# Patient Record
Sex: FEMALE | Race: BLACK OR AFRICAN AMERICAN | NOT HISPANIC OR LATINO | URBAN - METROPOLITAN AREA
[De-identification: names, ages, dates, MRNs, and addresses within clinical notes are randomized per-mention and may not be internally consistent; named-entity substitution may affect disease eponyms.]

---

## 2017-08-17 ENCOUNTER — INPATIENT (INPATIENT)
Facility: HOSPITAL | Age: 52
LOS: 3 days | Discharge: HOME | End: 2017-08-21
Attending: SURGERY | Admitting: SURGERY
Payer: MEDICAID

## 2017-08-17 VITALS
RESPIRATION RATE: 18 BRPM | SYSTOLIC BLOOD PRESSURE: 175 MMHG | HEART RATE: 75 BPM | TEMPERATURE: 98 F | DIASTOLIC BLOOD PRESSURE: 95 MMHG | OXYGEN SATURATION: 98 %

## 2017-08-17 LAB
ALBUMIN SERPL ELPH-MCNC: 4.2 G/DL — SIGNIFICANT CHANGE UP (ref 3.3–5)
ALP SERPL-CCNC: 85 U/L — SIGNIFICANT CHANGE UP (ref 40–120)
ALT FLD-CCNC: 36 U/L — HIGH (ref 4–33)
APPEARANCE UR: SIGNIFICANT CHANGE UP
AST SERPL-CCNC: 31 U/L — SIGNIFICANT CHANGE UP (ref 4–32)
BACTERIA # UR AUTO: SIGNIFICANT CHANGE UP
BASOPHILS # BLD AUTO: 0.05 K/UL — SIGNIFICANT CHANGE UP (ref 0–0.2)
BASOPHILS NFR BLD AUTO: 0.6 % — SIGNIFICANT CHANGE UP (ref 0–2)
BILIRUB SERPL-MCNC: 1.4 MG/DL — HIGH (ref 0.2–1.2)
BILIRUB UR-MCNC: NEGATIVE — SIGNIFICANT CHANGE UP
BLOOD UR QL VISUAL: NEGATIVE — SIGNIFICANT CHANGE UP
BUN SERPL-MCNC: 7 MG/DL — SIGNIFICANT CHANGE UP (ref 7–23)
CALCIUM SERPL-MCNC: 9.6 MG/DL — SIGNIFICANT CHANGE UP (ref 8.4–10.5)
CHLORIDE SERPL-SCNC: 102 MMOL/L — SIGNIFICANT CHANGE UP (ref 98–107)
CO2 SERPL-SCNC: 25 MMOL/L — SIGNIFICANT CHANGE UP (ref 22–31)
COLOR SPEC: SIGNIFICANT CHANGE UP
CREAT SERPL-MCNC: 0.71 MG/DL — SIGNIFICANT CHANGE UP (ref 0.5–1.3)
EOSINOPHIL # BLD AUTO: 0.04 K/UL — SIGNIFICANT CHANGE UP (ref 0–0.5)
EOSINOPHIL NFR BLD AUTO: 0.5 % — SIGNIFICANT CHANGE UP (ref 0–6)
GLUCOSE SERPL-MCNC: 125 MG/DL — HIGH (ref 70–99)
GLUCOSE UR-MCNC: NEGATIVE — SIGNIFICANT CHANGE UP
HCT VFR BLD CALC: 40.4 % — SIGNIFICANT CHANGE UP (ref 34.5–45)
HGB BLD-MCNC: 12.8 G/DL — SIGNIFICANT CHANGE UP (ref 11.5–15.5)
IMM GRANULOCYTES # BLD AUTO: 0.05 # — SIGNIFICANT CHANGE UP
IMM GRANULOCYTES NFR BLD AUTO: 0.6 % — SIGNIFICANT CHANGE UP (ref 0–1.5)
KETONES UR-MCNC: NEGATIVE — SIGNIFICANT CHANGE UP
LEUKOCYTE ESTERASE UR-ACNC: NEGATIVE — SIGNIFICANT CHANGE UP
LIDOCAIN IGE QN: 15.5 U/L — SIGNIFICANT CHANGE UP (ref 7–60)
LYMPHOCYTES # BLD AUTO: 1.9 K/UL — SIGNIFICANT CHANGE UP (ref 1–3.3)
LYMPHOCYTES # BLD AUTO: 23.1 % — SIGNIFICANT CHANGE UP (ref 13–44)
MCHC RBC-ENTMCNC: 24.9 PG — LOW (ref 27–34)
MCHC RBC-ENTMCNC: 31.7 % — LOW (ref 32–36)
MCV RBC AUTO: 78.6 FL — LOW (ref 80–100)
MONOCYTES # BLD AUTO: 0.66 K/UL — SIGNIFICANT CHANGE UP (ref 0–0.9)
MONOCYTES NFR BLD AUTO: 8 % — SIGNIFICANT CHANGE UP (ref 2–14)
MUCOUS THREADS # UR AUTO: SIGNIFICANT CHANGE UP
NEUTROPHILS # BLD AUTO: 5.51 K/UL — SIGNIFICANT CHANGE UP (ref 1.8–7.4)
NEUTROPHILS NFR BLD AUTO: 67.2 % — SIGNIFICANT CHANGE UP (ref 43–77)
NITRITE UR-MCNC: NEGATIVE — SIGNIFICANT CHANGE UP
NRBC # FLD: 0 — SIGNIFICANT CHANGE UP
PH UR: 7.5 — SIGNIFICANT CHANGE UP (ref 4.6–8)
PLATELET # BLD AUTO: 256 K/UL — SIGNIFICANT CHANGE UP (ref 150–400)
PMV BLD: 11.2 FL — SIGNIFICANT CHANGE UP (ref 7–13)
POTASSIUM SERPL-MCNC: 3.9 MMOL/L — SIGNIFICANT CHANGE UP (ref 3.5–5.3)
POTASSIUM SERPL-SCNC: 3.9 MMOL/L — SIGNIFICANT CHANGE UP (ref 3.5–5.3)
PROT SERPL-MCNC: 7.9 G/DL — SIGNIFICANT CHANGE UP (ref 6–8.3)
PROT UR-MCNC: NEGATIVE — SIGNIFICANT CHANGE UP
RBC # BLD: 5.14 M/UL — SIGNIFICANT CHANGE UP (ref 3.8–5.2)
RBC # FLD: 15.5 % — HIGH (ref 10.3–14.5)
SODIUM SERPL-SCNC: 141 MMOL/L — SIGNIFICANT CHANGE UP (ref 135–145)
SP GR SPEC: 1.01 — SIGNIFICANT CHANGE UP (ref 1–1.03)
SQUAMOUS # UR AUTO: SIGNIFICANT CHANGE UP
UROBILINOGEN FLD QL: NORMAL E.U. — SIGNIFICANT CHANGE UP (ref 0.1–0.2)
WBC # BLD: 8.21 K/UL — SIGNIFICANT CHANGE UP (ref 3.8–10.5)
WBC # FLD AUTO: 8.21 K/UL — SIGNIFICANT CHANGE UP (ref 3.8–10.5)
WBC UR QL: SIGNIFICANT CHANGE UP (ref 0–?)

## 2017-08-17 PROCEDURE — 76705 ECHO EXAM OF ABDOMEN: CPT | Mod: 26

## 2017-08-17 RX ORDER — MORPHINE SULFATE 50 MG/1
4 CAPSULE, EXTENDED RELEASE ORAL ONCE
Qty: 0 | Refills: 0 | Status: DISCONTINUED | OUTPATIENT
Start: 2017-08-17 | End: 2017-08-17

## 2017-08-17 RX ORDER — SODIUM CHLORIDE 9 MG/ML
1000 INJECTION INTRAMUSCULAR; INTRAVENOUS; SUBCUTANEOUS ONCE
Qty: 0 | Refills: 0 | Status: COMPLETED | OUTPATIENT
Start: 2017-08-17 | End: 2017-08-17

## 2017-08-17 RX ORDER — ONDANSETRON 8 MG/1
4 TABLET, FILM COATED ORAL ONCE
Qty: 0 | Refills: 0 | Status: COMPLETED | OUTPATIENT
Start: 2017-08-17 | End: 2017-08-17

## 2017-08-17 RX ADMIN — SODIUM CHLORIDE 1000 MILLILITER(S): 9 INJECTION INTRAMUSCULAR; INTRAVENOUS; SUBCUTANEOUS at 21:18

## 2017-08-17 RX ADMIN — MORPHINE SULFATE 4 MILLIGRAM(S): 50 CAPSULE, EXTENDED RELEASE ORAL at 21:16

## 2017-08-17 RX ADMIN — ONDANSETRON 4 MILLIGRAM(S): 8 TABLET, FILM COATED ORAL at 21:16

## 2017-08-17 NOTE — ED ADULT NURSE REASSESSMENT NOTE - NS ED NURSE REASSESS COMMENT FT1
2203 received pt from sono, pt alert and oriented x 3, no acute distress noted. 20 gauge heplock to right antecubital intact and patent. pt ambulated to bathroom with steady gait.

## 2017-08-17 NOTE — ED ADULT NURSE NOTE - OBJECTIVE STATEMENT
Oleg RN: 50 y/o female pt, rcvd in intake room 5, presents with c/o diffuse abd pain, n/v that started today, sts it happened last week but went away without any medical intervention. Pt not actively vomiting, denies chest pain, SOB, fever, chills. MD triana done, 20G placed on R AC, labs sent, medicated as ordered, VS as noted, NAD, pt went to US, to go to RW after.

## 2017-08-17 NOTE — ED PROVIDER NOTE - MEDICAL DECISION MAKING DETAILS
50 y/o F presents to the ED with abd pain for 1 days. Presentation consistent with cholecystitis. Will plan for labs, pain control, and US. 50 y/o F presents to the ED with abd pain for 1 days. Presentation consistent with possible cholecystitis. Will plan for labs, pain control, and US.

## 2017-08-17 NOTE — ED PROVIDER NOTE - PROGRESS NOTE DETAILS
RODRIGUE Harmon - pt signed out to me by Dr henning at the shift change. RUQ pain x 2 days, worse postprandially, us  equivocal, HIDA done with the results pending. on my exam pt tender in RUQ, but non-toxic appearing, denies hx of gallbladder disease. will provide additional pain control, will place in cdu. RODRIGUE Reynolds pt will be evaluted by sx, will provide additional pain control and move to main. RODRIGUE Harmon - pt signed out to me by Dr Franks at the shift change. RUQ pain x 2 days, worse postprandially, us  equivocal, HIDA done with the results pending. on my exam pt tender in RUQ, but non-toxic appearing, denies hx of gallbladder disease. will provide additional pain control, will place in cdu. Attending Note (Skylar): radiology called and informed me of positive HIDA results.  Surgery paged and informed.

## 2017-08-17 NOTE — ED PROVIDER NOTE - OBJECTIVE STATEMENT
52 y/o F with PMH of HTN, presents to the ED with abd pain for 1 week. Pt describes the pain as localized to the epigastric area, and intermittent in timing. She notes that the pain was sever this morning, and has associated nausea, chills, and 1 episode of vomiting (non-blood). She currently denies any fever, CP, SOB, diarrhea, and dysuria. 52 y/o F with PMH of HTN, presents to the ED with abd pain for 1 week. Pt describes the pain as localized to the epigastric area, and intermittent in timing. She notes that the pain was severe this morning, and has associated nausea, chills, and 1 episode of vomiting (non-blood). She currently denies any fever, CP, SOB, diarrhea, and dysuria.

## 2017-08-17 NOTE — ED ADULT NURSE NOTE - ED STAT RN HANDOFF DETAILS
endorsed to nathan walls. pt a*o x3, no c/o pain. vss. nad noted. medicated as ordered. safety maintained

## 2017-08-17 NOTE — ED PROVIDER NOTE - NS_EDPROVIDERDISPOUSERTYPE_ED_A_ED
I have personally evaluated and examined the patient. The Attending was available to me as a supervising provider if needed. Scribe Attestation (For Scribes USE Only)...

## 2017-08-18 DIAGNOSIS — R10.9 UNSPECIFIED ABDOMINAL PAIN: ICD-10-CM

## 2017-08-18 LAB
ALBUMIN SERPL ELPH-MCNC: 3.8 G/DL — SIGNIFICANT CHANGE UP (ref 3.3–5)
ALP SERPL-CCNC: 77 U/L — SIGNIFICANT CHANGE UP (ref 40–120)
ALT FLD-CCNC: 28 U/L — SIGNIFICANT CHANGE UP (ref 4–33)
APTT BLD: 41 SEC — HIGH (ref 27.5–37.4)
AST SERPL-CCNC: 21 U/L — SIGNIFICANT CHANGE UP (ref 4–32)
BASOPHILS # BLD AUTO: 0.04 K/UL — SIGNIFICANT CHANGE UP (ref 0–0.2)
BASOPHILS NFR BLD AUTO: 0.5 % — SIGNIFICANT CHANGE UP (ref 0–2)
BILIRUB DIRECT SERPL-MCNC: 0.3 MG/DL — HIGH (ref 0.1–0.2)
BILIRUB SERPL-MCNC: 1.2 MG/DL — SIGNIFICANT CHANGE UP (ref 0.2–1.2)
BLD GP AB SCN SERPL QL: NEGATIVE — SIGNIFICANT CHANGE UP
BUN SERPL-MCNC: 7 MG/DL — SIGNIFICANT CHANGE UP (ref 7–23)
CALCIUM SERPL-MCNC: 9 MG/DL — SIGNIFICANT CHANGE UP (ref 8.4–10.5)
CHLORIDE SERPL-SCNC: 101 MMOL/L — SIGNIFICANT CHANGE UP (ref 98–107)
CO2 SERPL-SCNC: 24 MMOL/L — SIGNIFICANT CHANGE UP (ref 22–31)
CREAT SERPL-MCNC: 0.77 MG/DL — SIGNIFICANT CHANGE UP (ref 0.5–1.3)
EOSINOPHIL # BLD AUTO: 0.05 K/UL — SIGNIFICANT CHANGE UP (ref 0–0.5)
EOSINOPHIL NFR BLD AUTO: 0.6 % — SIGNIFICANT CHANGE UP (ref 0–6)
GLUCOSE SERPL-MCNC: 106 MG/DL — HIGH (ref 70–99)
HCG SERPL-ACNC: < 5 MIU/ML — SIGNIFICANT CHANGE UP
HCT VFR BLD CALC: 37.9 % — SIGNIFICANT CHANGE UP (ref 34.5–45)
HGB BLD-MCNC: 11.9 G/DL — SIGNIFICANT CHANGE UP (ref 11.5–15.5)
IMM GRANULOCYTES # BLD AUTO: 0.04 # — SIGNIFICANT CHANGE UP
IMM GRANULOCYTES NFR BLD AUTO: 0.5 % — SIGNIFICANT CHANGE UP (ref 0–1.5)
INR BLD: 1.02 — SIGNIFICANT CHANGE UP (ref 0.88–1.17)
LIDOCAIN IGE QN: 10.8 U/L — SIGNIFICANT CHANGE UP (ref 7–60)
LYMPHOCYTES # BLD AUTO: 2.48 K/UL — SIGNIFICANT CHANGE UP (ref 1–3.3)
LYMPHOCYTES # BLD AUTO: 29.1 % — SIGNIFICANT CHANGE UP (ref 13–44)
MCHC RBC-ENTMCNC: 24.4 PG — LOW (ref 27–34)
MCHC RBC-ENTMCNC: 31.4 % — LOW (ref 32–36)
MCV RBC AUTO: 77.8 FL — LOW (ref 80–100)
MONOCYTES # BLD AUTO: 0.68 K/UL — SIGNIFICANT CHANGE UP (ref 0–0.9)
MONOCYTES NFR BLD AUTO: 8 % — SIGNIFICANT CHANGE UP (ref 2–14)
NEUTROPHILS # BLD AUTO: 5.24 K/UL — SIGNIFICANT CHANGE UP (ref 1.8–7.4)
NEUTROPHILS NFR BLD AUTO: 61.3 % — SIGNIFICANT CHANGE UP (ref 43–77)
NRBC # FLD: 0 — SIGNIFICANT CHANGE UP
PLATELET # BLD AUTO: 243 K/UL — SIGNIFICANT CHANGE UP (ref 150–400)
PMV BLD: 11.6 FL — SIGNIFICANT CHANGE UP (ref 7–13)
POTASSIUM SERPL-MCNC: 3.6 MMOL/L — SIGNIFICANT CHANGE UP (ref 3.5–5.3)
POTASSIUM SERPL-SCNC: 3.6 MMOL/L — SIGNIFICANT CHANGE UP (ref 3.5–5.3)
PROT SERPL-MCNC: 7.1 G/DL — SIGNIFICANT CHANGE UP (ref 6–8.3)
PROTHROM AB SERPL-ACNC: 11.4 SEC — SIGNIFICANT CHANGE UP (ref 9.8–13.1)
RBC # BLD: 4.87 M/UL — SIGNIFICANT CHANGE UP (ref 3.8–5.2)
RBC # FLD: 15.7 % — HIGH (ref 10.3–14.5)
RH IG SCN BLD-IMP: POSITIVE — SIGNIFICANT CHANGE UP
SODIUM SERPL-SCNC: 140 MMOL/L — SIGNIFICANT CHANGE UP (ref 135–145)
WBC # BLD: 8.53 K/UL — SIGNIFICANT CHANGE UP (ref 3.8–10.5)
WBC # FLD AUTO: 8.53 K/UL — SIGNIFICANT CHANGE UP (ref 3.8–10.5)

## 2017-08-18 PROCEDURE — 78226 HEPATOBILIARY SYSTEM IMAGING: CPT | Mod: 26,GC

## 2017-08-18 RX ORDER — AMLODIPINE BESYLATE 2.5 MG/1
1 TABLET ORAL
Qty: 0 | Refills: 0 | COMMUNITY

## 2017-08-18 RX ORDER — POTASSIUM CHLORIDE 20 MEQ
10 PACKET (EA) ORAL
Qty: 0 | Refills: 0 | Status: COMPLETED | OUTPATIENT
Start: 2017-08-18 | End: 2017-08-18

## 2017-08-18 RX ORDER — ONDANSETRON 8 MG/1
4 TABLET, FILM COATED ORAL EVERY 6 HOURS
Qty: 0 | Refills: 0 | Status: DISCONTINUED | OUTPATIENT
Start: 2017-08-18 | End: 2017-08-21

## 2017-08-18 RX ORDER — CEFOTETAN DISODIUM 1 G
2 VIAL (EA) INJECTION ONCE
Qty: 0 | Refills: 0 | Status: COMPLETED | OUTPATIENT
Start: 2017-08-18 | End: 2017-08-18

## 2017-08-18 RX ORDER — LISINOPRIL 2.5 MG/1
20 TABLET ORAL DAILY
Qty: 0 | Refills: 0 | Status: DISCONTINUED | OUTPATIENT
Start: 2017-08-18 | End: 2017-08-18

## 2017-08-18 RX ORDER — SODIUM CHLORIDE 9 MG/ML
1000 INJECTION, SOLUTION INTRAVENOUS
Qty: 0 | Refills: 0 | Status: DISCONTINUED | OUTPATIENT
Start: 2017-08-18 | End: 2017-08-21

## 2017-08-18 RX ORDER — HYDROMORPHONE HYDROCHLORIDE 2 MG/ML
1 INJECTION INTRAMUSCULAR; INTRAVENOUS; SUBCUTANEOUS EVERY 4 HOURS
Qty: 0 | Refills: 0 | Status: DISCONTINUED | OUTPATIENT
Start: 2017-08-18 | End: 2017-08-20

## 2017-08-18 RX ORDER — HYDROMORPHONE HYDROCHLORIDE 2 MG/ML
0.5 INJECTION INTRAMUSCULAR; INTRAVENOUS; SUBCUTANEOUS EVERY 4 HOURS
Qty: 0 | Refills: 0 | Status: DISCONTINUED | OUTPATIENT
Start: 2017-08-18 | End: 2017-08-20

## 2017-08-18 RX ORDER — FERROUS SULFATE 325(65) MG
1 TABLET ORAL
Qty: 0 | Refills: 0 | COMMUNITY

## 2017-08-18 RX ORDER — AMLODIPINE BESYLATE 2.5 MG/1
10 TABLET ORAL DAILY
Qty: 0 | Refills: 0 | Status: DISCONTINUED | OUTPATIENT
Start: 2017-08-18 | End: 2017-08-18

## 2017-08-18 RX ORDER — ENOXAPARIN SODIUM 100 MG/ML
40 INJECTION SUBCUTANEOUS EVERY 24 HOURS
Qty: 0 | Refills: 0 | Status: DISCONTINUED | OUTPATIENT
Start: 2017-08-18 | End: 2017-08-21

## 2017-08-18 RX ORDER — ACETAMINOPHEN 500 MG
650 TABLET ORAL EVERY 6 HOURS
Qty: 0 | Refills: 0 | Status: DISCONTINUED | OUTPATIENT
Start: 2017-08-18 | End: 2017-08-21

## 2017-08-18 RX ORDER — GABAPENTIN 400 MG/1
1 CAPSULE ORAL
Qty: 0 | Refills: 0 | COMMUNITY

## 2017-08-18 RX ORDER — AMLODIPINE BESYLATE 2.5 MG/1
10 TABLET ORAL DAILY
Qty: 0 | Refills: 0 | Status: DISCONTINUED | OUTPATIENT
Start: 2017-08-18 | End: 2017-08-21

## 2017-08-18 RX ORDER — LISINOPRIL 2.5 MG/1
1 TABLET ORAL
Qty: 0 | Refills: 0 | COMMUNITY

## 2017-08-18 RX ORDER — LISINOPRIL 2.5 MG/1
20 TABLET ORAL DAILY
Qty: 0 | Refills: 0 | Status: DISCONTINUED | OUTPATIENT
Start: 2017-08-18 | End: 2017-08-21

## 2017-08-18 RX ORDER — GABAPENTIN 400 MG/1
300 CAPSULE ORAL THREE TIMES A DAY
Qty: 0 | Refills: 0 | Status: DISCONTINUED | OUTPATIENT
Start: 2017-08-18 | End: 2017-08-21

## 2017-08-18 RX ORDER — MORPHINE SULFATE 50 MG/1
4 CAPSULE, EXTENDED RELEASE ORAL ONCE
Qty: 0 | Refills: 0 | Status: DISCONTINUED | OUTPATIENT
Start: 2017-08-18 | End: 2017-08-18

## 2017-08-18 RX ADMIN — LISINOPRIL 20 MILLIGRAM(S): 2.5 TABLET ORAL at 05:08

## 2017-08-18 RX ADMIN — GABAPENTIN 300 MILLIGRAM(S): 400 CAPSULE ORAL at 05:08

## 2017-08-18 RX ADMIN — AMLODIPINE BESYLATE 10 MILLIGRAM(S): 2.5 TABLET ORAL at 05:08

## 2017-08-18 RX ADMIN — SODIUM CHLORIDE 100 MILLILITER(S): 9 INJECTION, SOLUTION INTRAVENOUS at 21:51

## 2017-08-18 RX ADMIN — GABAPENTIN 300 MILLIGRAM(S): 400 CAPSULE ORAL at 21:51

## 2017-08-18 RX ADMIN — MORPHINE SULFATE 4 MILLIGRAM(S): 50 CAPSULE, EXTENDED RELEASE ORAL at 03:40

## 2017-08-18 RX ADMIN — MORPHINE SULFATE 4 MILLIGRAM(S): 50 CAPSULE, EXTENDED RELEASE ORAL at 02:39

## 2017-08-18 RX ADMIN — Medication 100 MILLIEQUIVALENT(S): at 10:14

## 2017-08-18 RX ADMIN — Medication 100 MILLIEQUIVALENT(S): at 08:53

## 2017-08-18 RX ADMIN — ENOXAPARIN SODIUM 40 MILLIGRAM(S): 100 INJECTION SUBCUTANEOUS at 04:08

## 2017-08-18 RX ADMIN — Medication 100 MILLIEQUIVALENT(S): at 11:28

## 2017-08-18 RX ADMIN — Medication 100 GRAM(S): at 04:00

## 2017-08-18 NOTE — H&P ADULT - ATTENDING COMMENTS
I have reviewed the history, pertinent labs and imaging, and discussed the care with the consult resident.    The active issues are:  1. acute calculous cholecystitis with mildly elevated bilirubin concerning for choledocholithiasis    Given the absence of elevated alkaline phosphatase and ductal dilation, it is a low suspicion for CBD obstruction due to stones.  Will repeat LFTs and proceed with lap mary if improved, possibly with IOC.

## 2017-08-18 NOTE — H&P ADULT - NSHPSOCIALHISTORY_GEN_ALL_CORE
Patient lives at home with her fiance and kids  She works at the [place] [facility] facility  No ETOH, drugs, or cigarettes

## 2017-08-18 NOTE — H&P ADULT - HISTORY OF PRESENT ILLNESS
51F with HTN presents with one day of abdominal pain. Patient states that she has had this pain in the past, not associated with eating or any activities, but it usually resolves on its own. This time, the pain started yesterday, was more severe in quality, and associated with one episode of emesis. The patient presented last night. No fevers or chills at home, no change in bowel habits.  In the ED, patient was afebrile and hypertensive to 176/93. She was given morphine for pain control, but when examined had some tenderness in the right upper quadrant, with the rest of her abdominal exam benign. Her labs showed a normal WBC of 8, but an elevated T. bili to 1.4. RUQ U/S showed no ductal dilatation, no wall thickening, cholecystitis. HIDA scan positive for acute cholecystitis.

## 2017-08-18 NOTE — H&P ADULT - NSHPLABSRESULTS_GEN_ALL_CORE
WBC 8  T bili 1.4  U/S   Liver:  Hepatic  steatosis.  Bile  ducts:  Normal  caliber.  Common  hepatic  duct  measures  7  mm.    Gallbladder:  Multiple  gallstones  with  largest  measuring  1.2  cm  stone  at  the  gallbladder  neck.  Gallbladder  wall  measures  3  mm  in  thickness,  upper  limits  of  normal..  Positive  sonographic  Madsen's  sign.  Pancreas:  Visualized  pancreatic  neck  and  body  shows  no  focal  ductal  dilatation.  Right  kidney:  1.3.  cm.  No  hydronephrosis.  IVC:  Visualized  portions  are  within  normal  limits.    IMPRESSION:    Cholelithiasis  with  a  positive  Madsen's  sonographic  sign  but  without  wall  thickening  is  equivocal  for  acute  cholecystitis.  Consider  HIDA  scan  for  further  evaluation.    Hepatic  steatosis.    HIDA + for acute cholecystitis

## 2017-08-18 NOTE — H&P ADULT - NSHPPHYSICALEXAM_GEN_ALL_CORE
Vital Signs Last 24 Hrs  T(C): 37.3 (17 Aug 2017 23:02), Max: 37.3 (17 Aug 2017 23:02)  T(F): 99.1 (17 Aug 2017 23:02), Max: 99.1 (17 Aug 2017 23:02)  HR: 69 (18 Aug 2017 02:35) (69 - 77)  BP: 176/93 (18 Aug 2017 02:35) (141/76 - 176/93)  BP(mean): --  RR: 17 (18 Aug 2017 02:35) (17 - 18)  SpO2: 100% (18 Aug 2017 02:35) (98% - 100%)    PHYSICAL EXAM:      Constitutional: NAD    Eyes: anicteric    ENMT: no rhinorrhea    Neck: supple    Respiratory: Clear bilaterally, respirations not labored, no retractions    Cardiovascular: RRR, NL S1S2    Gastrointestinal: Soft, ND, tender in RUQ    Extremities: No deformities    Vascular: Ext. warm, normal cap refill    Neurological: sensation and motor intact to all extremities    Skin: warm, dry, no rash    Lymph Nodes: no palpable adenopathy Vital Signs Last 24 Hrs  T(C): 37.3 (17 Aug 2017 23:02), Max: 37.3 (17 Aug 2017 23:02)  T(F): 99.1 (17 Aug 2017 23:02), Max: 99.1 (17 Aug 2017 23:02)  HR: 69 (18 Aug 2017 02:35) (69 - 77)  BP: 176/93 (18 Aug 2017 02:35) (141/76 - 176/93)  BP(mean): --  RR: 17 (18 Aug 2017 02:35) (17 - 18)  SpO2: 100% (18 Aug 2017 02:35) (98% - 100%)    PHYSICAL EXAM:      Constitutional: NAD    Eyes: anicteric    ENMT: no rhinorrhea    Neck: supple    Respiratory: Clear bilaterally, respirations not labored, no retractions    Cardiovascular: RRR, NL S1S2    Gastrointestinal: Soft, obese, ND, tender in RUQ    Extremities: No deformities    Vascular: Ext. warm, normal cap refill    Neurological: sensation and motor intact to all extremities    Skin: warm, dry, no rash    Lymph Nodes: no palpable adenopathy

## 2017-08-18 NOTE — PROGRESS NOTE ADULT - ASSESSMENT
ASSESSMENT/PLAN: SIENNA RIVERO 51y Female w/ acute cholecystitis   - Diet: NPO   - Pain control   - Input and outputs   -DVT ppx  - OR plan     B Team   [id number]

## 2017-08-18 NOTE — H&P ADULT - ASSESSMENT
51F p/w acute cholecystitis  -NPO with IVF  -home BP medications  - IV abx  - repeat labs in AM to check total bilirubin  - pain control  - discussed with Dr. Rowland

## 2017-08-19 LAB
ALBUMIN SERPL ELPH-MCNC: 3.4 G/DL — SIGNIFICANT CHANGE UP (ref 3.3–5)
ALP SERPL-CCNC: 72 U/L — SIGNIFICANT CHANGE UP (ref 40–120)
ALT FLD-CCNC: 24 U/L — SIGNIFICANT CHANGE UP (ref 4–33)
AST SERPL-CCNC: 21 U/L — SIGNIFICANT CHANGE UP (ref 4–32)
BILIRUB SERPL-MCNC: 1.4 MG/DL — HIGH (ref 0.2–1.2)
BLD GP AB SCN SERPL QL: NEGATIVE — SIGNIFICANT CHANGE UP
BUN SERPL-MCNC: 8 MG/DL — SIGNIFICANT CHANGE UP (ref 7–23)
CALCIUM SERPL-MCNC: 9 MG/DL — SIGNIFICANT CHANGE UP (ref 8.4–10.5)
CHLORIDE SERPL-SCNC: 103 MMOL/L — SIGNIFICANT CHANGE UP (ref 98–107)
CO2 SERPL-SCNC: 23 MMOL/L — SIGNIFICANT CHANGE UP (ref 22–31)
CREAT SERPL-MCNC: 0.8 MG/DL — SIGNIFICANT CHANGE UP (ref 0.5–1.3)
GLUCOSE SERPL-MCNC: 86 MG/DL — SIGNIFICANT CHANGE UP (ref 70–99)
HCT VFR BLD CALC: 36.6 % — SIGNIFICANT CHANGE UP (ref 34.5–45)
HGB BLD-MCNC: 11.6 G/DL — SIGNIFICANT CHANGE UP (ref 11.5–15.5)
MAGNESIUM SERPL-MCNC: 2.2 MG/DL — SIGNIFICANT CHANGE UP (ref 1.6–2.6)
MCHC RBC-ENTMCNC: 24.6 PG — LOW (ref 27–34)
MCHC RBC-ENTMCNC: 31.7 % — LOW (ref 32–36)
MCV RBC AUTO: 77.7 FL — LOW (ref 80–100)
NRBC # FLD: 0 — SIGNIFICANT CHANGE UP
PHOSPHATE SERPL-MCNC: 2.8 MG/DL — SIGNIFICANT CHANGE UP (ref 2.5–4.5)
PLATELET # BLD AUTO: 249 K/UL — SIGNIFICANT CHANGE UP (ref 150–400)
PMV BLD: 11.7 FL — SIGNIFICANT CHANGE UP (ref 7–13)
POTASSIUM SERPL-MCNC: 4 MMOL/L — SIGNIFICANT CHANGE UP (ref 3.5–5.3)
POTASSIUM SERPL-SCNC: 4 MMOL/L — SIGNIFICANT CHANGE UP (ref 3.5–5.3)
PROT SERPL-MCNC: 6.6 G/DL — SIGNIFICANT CHANGE UP (ref 6–8.3)
RBC # BLD: 4.71 M/UL — SIGNIFICANT CHANGE UP (ref 3.8–5.2)
RBC # FLD: 15.7 % — HIGH (ref 10.3–14.5)
RH IG SCN BLD-IMP: POSITIVE — SIGNIFICANT CHANGE UP
SODIUM SERPL-SCNC: 138 MMOL/L — SIGNIFICANT CHANGE UP (ref 135–145)
WBC # BLD: 6.38 K/UL — SIGNIFICANT CHANGE UP (ref 3.8–10.5)
WBC # FLD AUTO: 6.38 K/UL — SIGNIFICANT CHANGE UP (ref 3.8–10.5)

## 2017-08-19 RX ADMIN — GABAPENTIN 300 MILLIGRAM(S): 400 CAPSULE ORAL at 13:11

## 2017-08-19 RX ADMIN — LISINOPRIL 20 MILLIGRAM(S): 2.5 TABLET ORAL at 05:11

## 2017-08-19 RX ADMIN — SODIUM CHLORIDE 100 MILLILITER(S): 9 INJECTION, SOLUTION INTRAVENOUS at 05:11

## 2017-08-19 RX ADMIN — ENOXAPARIN SODIUM 40 MILLIGRAM(S): 100 INJECTION SUBCUTANEOUS at 05:11

## 2017-08-19 RX ADMIN — AMLODIPINE BESYLATE 10 MILLIGRAM(S): 2.5 TABLET ORAL at 05:11

## 2017-08-19 RX ADMIN — SODIUM CHLORIDE 100 MILLILITER(S): 9 INJECTION, SOLUTION INTRAVENOUS at 21:54

## 2017-08-19 RX ADMIN — SODIUM CHLORIDE 100 MILLILITER(S): 9 INJECTION, SOLUTION INTRAVENOUS at 13:11

## 2017-08-19 RX ADMIN — SODIUM CHLORIDE 100 MILLILITER(S): 9 INJECTION, SOLUTION INTRAVENOUS at 11:30

## 2017-08-19 RX ADMIN — GABAPENTIN 300 MILLIGRAM(S): 400 CAPSULE ORAL at 05:11

## 2017-08-19 RX ADMIN — GABAPENTIN 300 MILLIGRAM(S): 400 CAPSULE ORAL at 21:54

## 2017-08-19 NOTE — CHART NOTE - NSCHARTNOTEFT_GEN_A_CORE
Diagnosis: cholecystitis  Procedure: lap mary possible open  T(C): 36.7 (08-19-17 @ 18:15), Max: 37.1 (08-18-17 @ 20:59)  HR: 78 (08-19-17 @ 18:15) (66 - 78)  BP: 144/92 (08-19-17 @ 18:15) (117/73 - 146/71)  RR: 18 (08-19-17 @ 18:15) (17 - 18)  SpO2: 99% (08-19-17 @ 18:15) (87% - 99%)  Wt(kg): --        Pertinent Labs:                            11.6   6.38  )-----------( 249      ( 19 Aug 2017 06:30 )             36.6       08-19    138  |  103  |  8   ----------------------------<  86  4.0   |  23  |  0.80    Ca    9.0      19 Aug 2017 06:30  Phos  2.8     08-19  Mg     2.2     08-19    TPro  6.6  /  Alb  3.4  /  TBili  1.4<H>  /  DBili  x   /  AST  21  /  ALT  24  /  AlkPhos  72  08-19      PT/INR - ( 18 Aug 2017 06:00 )   PT: 11.4 SEC;   INR: 1.02          PTT - ( 18 Aug 2017 06:00 )  PTT:41.0 SEC      Permit: Signed and in chart  Type and screen performed  Urine pregnancy negative 8/17, repeat pending    Plan:  NPO after midnight  IVF  OR tomorrow with Dr. Rowland

## 2017-08-19 NOTE — PROGRESS NOTE ADULT - ASSESSMENT
ASSESSMENT/PLAN: SIENNA RIVERO 51y Female w/ acute cholecystitis   - Diet: CLD -> regular as tolerated, NPO after midnight  - Pain control   - Input and outputs   - DVT ppx  - Plan for  OR tomorrow for lap mary    B Team   [id number]

## 2017-08-20 ENCOUNTER — TRANSCRIPTION ENCOUNTER (OUTPATIENT)
Age: 52
End: 2017-08-20

## 2017-08-20 ENCOUNTER — RESULT REVIEW (OUTPATIENT)
Age: 52
End: 2017-08-20

## 2017-08-20 LAB
ALBUMIN SERPL ELPH-MCNC: 3.6 G/DL — SIGNIFICANT CHANGE UP (ref 3.3–5)
ALP SERPL-CCNC: 72 U/L — SIGNIFICANT CHANGE UP (ref 40–120)
ALT FLD-CCNC: 22 U/L — SIGNIFICANT CHANGE UP (ref 4–33)
AST SERPL-CCNC: 17 U/L — SIGNIFICANT CHANGE UP (ref 4–32)
BILIRUB DIRECT SERPL-MCNC: 0.2 MG/DL — SIGNIFICANT CHANGE UP (ref 0.1–0.2)
BILIRUB SERPL-MCNC: 0.8 MG/DL — SIGNIFICANT CHANGE UP (ref 0.2–1.2)
BUN SERPL-MCNC: 10 MG/DL — SIGNIFICANT CHANGE UP (ref 7–23)
CALCIUM SERPL-MCNC: 9.3 MG/DL — SIGNIFICANT CHANGE UP (ref 8.4–10.5)
CHLORIDE SERPL-SCNC: 103 MMOL/L — SIGNIFICANT CHANGE UP (ref 98–107)
CO2 SERPL-SCNC: 23 MMOL/L — SIGNIFICANT CHANGE UP (ref 22–31)
CREAT SERPL-MCNC: 0.7 MG/DL — SIGNIFICANT CHANGE UP (ref 0.5–1.3)
GLUCOSE SERPL-MCNC: 122 MG/DL — HIGH (ref 70–99)
HCT VFR BLD CALC: 36.5 % — SIGNIFICANT CHANGE UP (ref 34.5–45)
HGB BLD-MCNC: 11.4 G/DL — LOW (ref 11.5–15.5)
INR BLD: 0.99 — SIGNIFICANT CHANGE UP (ref 0.88–1.17)
MAGNESIUM SERPL-MCNC: 2 MG/DL — SIGNIFICANT CHANGE UP (ref 1.6–2.6)
MCHC RBC-ENTMCNC: 24.3 PG — LOW (ref 27–34)
MCHC RBC-ENTMCNC: 31.2 % — LOW (ref 32–36)
MCV RBC AUTO: 77.7 FL — LOW (ref 80–100)
NRBC # FLD: 0 — SIGNIFICANT CHANGE UP
PHOSPHATE SERPL-MCNC: 3.2 MG/DL — SIGNIFICANT CHANGE UP (ref 2.5–4.5)
PLATELET # BLD AUTO: 272 K/UL — SIGNIFICANT CHANGE UP (ref 150–400)
PMV BLD: 11.4 FL — SIGNIFICANT CHANGE UP (ref 7–13)
POTASSIUM SERPL-MCNC: 3.9 MMOL/L — SIGNIFICANT CHANGE UP (ref 3.5–5.3)
POTASSIUM SERPL-SCNC: 3.9 MMOL/L — SIGNIFICANT CHANGE UP (ref 3.5–5.3)
PROT SERPL-MCNC: 6.7 G/DL — SIGNIFICANT CHANGE UP (ref 6–8.3)
PROTHROM AB SERPL-ACNC: 11.1 SEC — SIGNIFICANT CHANGE UP (ref 9.8–13.1)
RBC # BLD: 4.7 M/UL — SIGNIFICANT CHANGE UP (ref 3.8–5.2)
RBC # FLD: 15.8 % — HIGH (ref 10.3–14.5)
SODIUM SERPL-SCNC: 140 MMOL/L — SIGNIFICANT CHANGE UP (ref 135–145)
WBC # BLD: 6.11 K/UL — SIGNIFICANT CHANGE UP (ref 3.8–10.5)
WBC # FLD AUTO: 6.11 K/UL — SIGNIFICANT CHANGE UP (ref 3.8–10.5)

## 2017-08-20 PROCEDURE — 47562 LAPAROSCOPIC CHOLECYSTECTOMY: CPT | Mod: GC

## 2017-08-20 PROCEDURE — 88304 TISSUE EXAM BY PATHOLOGIST: CPT | Mod: 26

## 2017-08-20 RX ORDER — OXYCODONE AND ACETAMINOPHEN 5; 325 MG/1; MG/1
2 TABLET ORAL EVERY 6 HOURS
Qty: 0 | Refills: 0 | Status: DISCONTINUED | OUTPATIENT
Start: 2017-08-20 | End: 2017-08-21

## 2017-08-20 RX ORDER — HYDROMORPHONE HYDROCHLORIDE 2 MG/ML
1 INJECTION INTRAMUSCULAR; INTRAVENOUS; SUBCUTANEOUS
Qty: 0 | Refills: 0 | Status: DISCONTINUED | OUTPATIENT
Start: 2017-08-20 | End: 2017-08-20

## 2017-08-20 RX ORDER — ONDANSETRON 8 MG/1
4 TABLET, FILM COATED ORAL ONCE
Qty: 0 | Refills: 0 | Status: DISCONTINUED | OUTPATIENT
Start: 2017-08-20 | End: 2017-08-20

## 2017-08-20 RX ORDER — OXYCODONE AND ACETAMINOPHEN 5; 325 MG/1; MG/1
1 TABLET ORAL EVERY 4 HOURS
Qty: 0 | Refills: 0 | Status: DISCONTINUED | OUTPATIENT
Start: 2017-08-20 | End: 2017-08-21

## 2017-08-20 RX ADMIN — Medication 650 MILLIGRAM(S): at 22:02

## 2017-08-20 RX ADMIN — SODIUM CHLORIDE 50 MILLILITER(S): 9 INJECTION, SOLUTION INTRAVENOUS at 19:54

## 2017-08-20 RX ADMIN — ENOXAPARIN SODIUM 40 MILLIGRAM(S): 100 INJECTION SUBCUTANEOUS at 05:21

## 2017-08-20 RX ADMIN — SODIUM CHLORIDE 100 MILLILITER(S): 9 INJECTION, SOLUTION INTRAVENOUS at 05:21

## 2017-08-20 RX ADMIN — GABAPENTIN 300 MILLIGRAM(S): 400 CAPSULE ORAL at 21:11

## 2017-08-20 RX ADMIN — Medication 650 MILLIGRAM(S): at 22:50

## 2017-08-20 RX ADMIN — GABAPENTIN 300 MILLIGRAM(S): 400 CAPSULE ORAL at 05:21

## 2017-08-20 RX ADMIN — AMLODIPINE BESYLATE 10 MILLIGRAM(S): 2.5 TABLET ORAL at 05:21

## 2017-08-20 RX ADMIN — SODIUM CHLORIDE 50 MILLILITER(S): 9 INJECTION, SOLUTION INTRAVENOUS at 21:11

## 2017-08-20 RX ADMIN — LISINOPRIL 20 MILLIGRAM(S): 2.5 TABLET ORAL at 05:21

## 2017-08-20 NOTE — PROVIDER CONTACT NOTE (OTHER) - REASON
pt due to go to OR today and ordered for Lovenox Injection, ok to give?
pt scheduled to go to OR today or tomorrow however pt is due to Lovenox Injection at 4am today, ok to give?

## 2017-08-20 NOTE — PROGRESS NOTE ADULT - ATTENDING COMMENTS
I have personally interviewed and examined this patient, reviewed pertinent labs and imaging, and discussed the case with residents on B Team rounds.    The active care issues are:  1. acute cholecystitis with normalized LFTs suggesting passage of CBD stone    Patient understands the risks, benefits, alternatives and recovery from laparoscopic versus open cholecystectomy, and we will proceed today pending OR availability.
Acute cholecystitis  -Resolved pain  -Clears trial  -NPO at midnight.  Will reevaluate for surgery tomorrow  -Abx

## 2017-08-20 NOTE — PROGRESS NOTE ADULT - ASSESSMENT
ASSESSMENT/PLAN: SIENNA RIVERO 51y Female w/ acute cholecystitis   - Diet: NPO for OR today  - Pain control   - Input and outputs   - DVT ppx  - OR today for lap mary possible open    B Team   [id number]

## 2017-08-20 NOTE — PROVIDER CONTACT NOTE (OTHER) - ACTION/TREATMENT ORDERED:
ok to give Lovenox Injection as scheduled; continue to monitor
ok to give Lovenox Injection as scheduled as pt is not confirmed to go to OR today; continue to monitor

## 2017-08-21 ENCOUNTER — TRANSCRIPTION ENCOUNTER (OUTPATIENT)
Age: 52
End: 2017-08-21

## 2017-08-21 VITALS
TEMPERATURE: 98 F | HEART RATE: 76 BPM | SYSTOLIC BLOOD PRESSURE: 131 MMHG | RESPIRATION RATE: 20 BRPM | DIASTOLIC BLOOD PRESSURE: 88 MMHG | OXYGEN SATURATION: 100 %

## 2017-08-21 RX ORDER — ACETAMINOPHEN 500 MG
2 TABLET ORAL
Qty: 0 | Refills: 0 | COMMUNITY
Start: 2017-08-21

## 2017-08-21 RX ORDER — OXYCODONE HYDROCHLORIDE 5 MG/1
1 TABLET ORAL
Qty: 12 | Refills: 0 | OUTPATIENT
Start: 2017-08-21

## 2017-08-21 RX ADMIN — Medication 650 MILLIGRAM(S): at 11:33

## 2017-08-21 RX ADMIN — Medication 650 MILLIGRAM(S): at 06:10

## 2017-08-21 RX ADMIN — LISINOPRIL 20 MILLIGRAM(S): 2.5 TABLET ORAL at 05:23

## 2017-08-21 RX ADMIN — GABAPENTIN 300 MILLIGRAM(S): 400 CAPSULE ORAL at 15:09

## 2017-08-21 RX ADMIN — Medication 650 MILLIGRAM(S): at 05:23

## 2017-08-21 RX ADMIN — AMLODIPINE BESYLATE 10 MILLIGRAM(S): 2.5 TABLET ORAL at 05:23

## 2017-08-21 RX ADMIN — Medication 650 MILLIGRAM(S): at 12:33

## 2017-08-21 RX ADMIN — ENOXAPARIN SODIUM 40 MILLIGRAM(S): 100 INJECTION SUBCUTANEOUS at 05:23

## 2017-08-21 RX ADMIN — GABAPENTIN 300 MILLIGRAM(S): 400 CAPSULE ORAL at 05:23

## 2017-08-21 NOTE — DISCHARGE NOTE ADULT - INSTRUCTIONS
Low residue diet. A low residue low residue diet is a diet that is designed to "rest" the bowel. It's a diet that limits high-fiber foods, like whole-grain breads and cereals, nuts, seeds, raw or dried fruits, and vegetables.    "Residue" refers to undigested food, including fiber, that makes up stool. The goal of the diet is to have fewer, smaller bowel movements each day. That will ease symptoms such as diarrhea, bloating, gas, and stomach cramping.

## 2017-08-21 NOTE — DISCHARGE NOTE ADULT - PLAN OF CARE
s/p laparoscopic cholecystectomy Please take Tylenol or Ibuprofen as directed for pain as needed. You may take Percocet for severe pain unrelieved by Tylenol or Ibuprofen. One tablet of Percocet contains 325mg of Tylenol. Please Do Not exceed more than 4,000mg or Tylenol in a 24 hour period. You may shower. Leave steri strips in place, it will fall off on it's own. Please call to make an appointment to see your surgeon Dr. Rowland for post-op check up in 1-2 weeks. Please notify your doctor or surgeon if you have fever, chills, new drainage or increased abdominal pain unrelieved by pain medications. Please take Tylenol or Ibuprofen as directed for pain as needed. You may take Percocet for severe pain unrelieved by Tylenol or Ibuprofen. One tablet of Percocet contains 325mg of Tylenol. Please Do Not exceed more than 4,000mg or Tylenol in a 24 hour period. Please do not drive or operate any machinery while taking percocet. You may shower. Leave steri strips in place, it will fall off on it's own. Please call to make an appointment to see your surgeon Dr. Rowland for post-op check up in 1-2 weeks. Please notify your doctor or surgeon if you have fever, chills, new drainage or increased abdominal pain unrelieved by pain medications. You may apply cold compresses to area of abdominal pain due to surgery. Please take Tylenol or Ibuprofen as directed for pain as needed. You may take Percocet for severe pain unrelieved by Tylenol or Ibuprofen. One tablet of Percocet contains 325mg of Tylenol. Please Do Not exceed more than 4,000mg or Tylenol in a 24 hour period. Please do not drive or operate any machinery while taking percocet. You may shower. Leave steri strips in place, it will fall off on it's own. Please call to make an appointment to see your surgeon Dr. Rowland for post-op check up in 1-2 weeks. Please notify your doctor or surgeon if you have fever, chills, new drainage or increased abdominal pain unrelieved by pain medications.

## 2017-08-21 NOTE — DISCHARGE NOTE ADULT - HOSPITAL COURSE
51F with HTN presents with one day of abdominal pain. Patient states that she has had this pain in the past, not associated with eating or any activities, but it usually resolves on its own. This time, the pain started yesterday, was more severe in quality, and associated with one episode of emesis. The patient presented last night. No fevers or chills at home, no change in bowel habits.  In the ED, patient was afebrile and hypertensive to 176/93. She was given morphine for pain control, but when examined had some tenderness in the right upper quadrant, with the rest of her abdominal exam benign. Her labs showed a normal WBC of 8, but an elevated T. bili to 1.4. RUQ U/S showed no ductal dilatation, no wall thickening, cholecystitis. HIDA scan positive for acute cholecystitis.  Patient is s/p laparoscopic cholecystectomy on 8/20 by Dr. Rowland. Patient is tolerating a low residue diet, pain is controlled and patient is ambulating without difficulty. Patient has been instructed to follow up with Dr. Rowland in 1-2 weeks for post-op check up. Patient is stable for discharge home.

## 2017-08-21 NOTE — DISCHARGE NOTE ADULT - CARE PROVIDER_API CALL
Josiah Rowland), DieticianNutrition; Surgery; Surgical Critical Care  [address]  [address] [address]  Phone: [phone]  Fax: [phone]

## 2017-08-21 NOTE — DISCHARGE NOTE ADULT - PATIENT PORTAL LINK FT
“You can access the [employer] Patient Portal, offered by [facility], by registering with the following website: [email or web address]”

## 2017-08-21 NOTE — DISCHARGE NOTE ADULT - CARE PLAN
See Care Plan under Instructions Principal Discharge DX:	Cholecystitis, acute  Goal:	s/p laparoscopic cholecystectomy  Instructions for follow-up, activity and diet:	Please take Tylenol or Ibuprofen as directed for pain as needed. You may take Percocet for severe pain unrelieved by Tylenol or Ibuprofen. One tablet of Percocet contains 325mg of Tylenol. Please Do Not exceed more than 4,000mg or Tylenol in a 24 hour period. You may shower. Leave steri strips in place, it will fall off on it's own. Please call to make an appointment to see your surgeon Dr. Rowland for post-op check up in 1-2 weeks. Please notify your doctor or surgeon if you have fever, chills, new drainage or increased abdominal pain unrelieved by pain medications. Principal Discharge DX:	Cholecystitis, acute  Goal:	s/p laparoscopic cholecystectomy  Instructions for follow-up, activity and diet:	Please take Tylenol or Ibuprofen as directed for pain as needed. You may take Percocet for severe pain unrelieved by Tylenol or Ibuprofen. One tablet of Percocet contains 325mg of Tylenol. Please Do Not exceed more than 4,000mg or Tylenol in a 24 hour period. Please do not drive or operate any machinery while taking percocet. You may shower. Leave steri strips in place, it will fall off on it's own. Please call to make an appointment to see your surgeon Dr. Rowland for post-op check up in 1-2 weeks. Please notify your doctor or surgeon if you have fever, chills, new drainage or increased abdominal pain unrelieved by pain medications. Principal Discharge DX:	Cholecystitis, acute  Goal:	s/p laparoscopic cholecystectomy  Instructions for follow-up, activity and diet:	You may apply cold compresses to area of abdominal pain due to surgery. Please take Tylenol or Ibuprofen as directed for pain as needed. You may take Percocet for severe pain unrelieved by Tylenol or Ibuprofen. One tablet of Percocet contains 325mg of Tylenol. Please Do Not exceed more than 4,000mg or Tylenol in a 24 hour period. Please do not drive or operate any machinery while taking percocet. You may shower. Leave steri strips in place, it will fall off on it's own. Please call to make an appointment to see your surgeon Dr. Rowland for post-op check up in 1-2 weeks. Please notify your doctor or surgeon if you have fever, chills, new drainage or increased abdominal pain unrelieved by pain medications.

## 2017-08-21 NOTE — DISCHARGE NOTE ADULT - MEDICATION SUMMARY - MEDICATIONS TO TAKE
I will START or STAY ON the medications listed below when I get home from the hospital:    acetaminophen 325 mg oral tablet  -- 2 tab(s) by mouth every 6 hours, As needed, Mild Pain (1 - 3)  -- Indication: For mild pain    acetaminophen-oxycodone 325 mg-5 mg oral tablet  -- 1 tab by mouth every 4-6 hours, As needed, Moderate Pain (4 - 6) or 2 tabs by mouth every 4-6 hours prn Severe pain MDD:6  -- Indication: For moderate to severe pain    lisinopril 20 mg oral tablet  -- 1 tab(s) by mouth once a day  -- Indication: For htn    gabapentin 300 mg oral capsule  -- 1 cap(s) by mouth 3 times a day  -- Indication: For pain    amLODIPine 10 mg oral tablet  -- 1 tab(s) by mouth once a day  -- Indication: For htn    hydroCHLOROthiazide 25 mg oral tablet  -- 1 tab(s) by mouth once a day  -- Indication: For htn    ferrous sulfate 325 mg (65 mg elemental iron) oral tablet  -- 1 tab(s) by mouth 3 times a day  -- Indication: For supplement

## 2017-08-21 NOTE — PROGRESS NOTE ADULT - SUBJECTIVE AND OBJECTIVE BOX
B Team Progress Note     Patient is a 51y old  Female who presents with a chief complaint of abdominal pain (18 Aug 2017 03:29)      SUBJECTIVE: Pt seen and examined at bedside .  no nausea/vomiting/headache/dizziness/chest pain/shortness of breath    OBJECTIVE:  PHYSICAL EXAM:  GA: NAD  Abdomen:  -  soft, non-distended, RUQ tenderness       Vitals/Labs:  Vital Signs Last 24 Hrs  T(C): 37.3 (17 Aug 2017 23:02), Max: 37.3 (17 Aug 2017 23:02)  T(F): 99.1 (17 Aug 2017 23:02), Max: 99.1 (17 Aug 2017 23:02)  HR: 69 (18 Aug 2017 02:35) (69 - 77)  BP: 176/93 (18 Aug 2017 02:35) (141/76 - 176/93)  BP(mean): --  RR: 17 (18 Aug 2017 02:35) (17 - 18)  SpO2: 100% (18 Aug 2017 02:35) (98% - 100%)    I&O's Detail    17 Aug 2017 07:01  -  18 Aug 2017 07:00  --------------------------------------------------------  IN:    lactated ringers.: 300 mL  Total IN: 300 mL    OUT:  Total OUT: 0 mL    Total NET: 300 mL                                11.9   8.53  )-----------( 243      ( 18 Aug 2017 06:00 )             37.9       08-18    140  |  101  |  7   ----------------------------<  106<H>  3.6   |  24  |  0.77    Ca    9.0      18 Aug 2017 06:00    TPro  7.1  /  Alb  3.8  /  TBili  1.2  /  DBili  0.3<H>  /  AST  21  /  ALT  28  /  AlkPhos  77  08-18      CAPILLARY BLOOD GLUCOSE          LIVER FUNCTIONS - ( 18 Aug 2017 06:00 )  Alb: 3.8 g/dL / Pro: 7.1 g/dL / ALK PHOS: 77 u/L / ALT: 28 u/L / AST: 21 u/L / GGT: x             PT/INR - ( 18 Aug 2017 06:00 )   PT: 11.4 SEC;   INR: 1.02          PTT - ( 18 Aug 2017 06:00 )  PTT:41.0 SEC    Urinalysis Basic - ( 17 Aug 2017 21:09 )    Color: PLYEL / Appearance: HAZY / S.012 / pH: 7.5  Gluc: NEGATIVE / Ketone: NEGATIVE  / Bili: NEGATIVE / Urobili: NORMAL E.U.   Blood: NEGATIVE / Protein: NEGATIVE / Nitrite: NEGATIVE   Leuk Esterase: NEGATIVE / RBC: x / WBC 2-5   Sq Epi: OCC / Non Sq Epi: x / Bacteria: FEW      MEDICATIONS  (STANDING):  enoxaparin Injectable 40 milliGRAM(s) SubCutaneous every 24 hours  lactated ringers. 1000 milliLiter(s) (100 mL/Hr) IV Continuous <Continuous>  gabapentin 300 milliGRAM(s) Oral three times a day  lisinopril 20 milliGRAM(s) Oral daily  amLODIPine   Tablet 10 milliGRAM(s) Oral daily  potassium chloride  10 mEq/100 mL IVPB 10 milliEquivalent(s) IV Intermittent every 1 hour    MEDICATIONS  (PRN):  ondansetron Injectable 4 milliGRAM(s) IV Push every 6 hours PRN Nausea and/or Vomiting  acetaminophen   Tablet. 650 milliGRAM(s) Oral every 6 hours PRN Mild Pain (1 - 3)  HYDROmorphone  Injectable 0.5 milliGRAM(s) IV Push every 4 hours PRN Moderate Pain (4 - 6)  HYDROmorphone  Injectable 1 milliGRAM(s) IV Push every 4 hours PRN Severe Pain (7 - 10)
Postoperative day#1 s/p laparoscopic cholecystectomy for recurrent biliary colic    S>Patient complains of incisional pain (umbilical port).  She is tolerating a regular diet, she denies nausea and vomiting.      O> Afebrile , vital signs are normal       Anicteric sclera, pink conjunctiva       Incision appear intact, without erythema or drainage  A>  Biliary colic s/p lap mary PD#1  P>  Diet as tolerated        Cold compress to incision        Percocet as needed        Discharge today        Case discussed with B team
Surgery Postop Note    Patient is a 52y old  Female who presents with a chief complaint of abdominal pain (18 Aug 2017 03:29)      SUBJECTIVE: The patient was seen and examined s/p laparoscopic cholecystectomy. C/o some pain and dizziness. Ambulating, voiding, and tolerating regular diet. Denies nausea/vomiting/headache/chest pain/shortness of breath/fever. She says she does not feel ready to go home yet and would prefer to stay the night.    OBJECTIVE:  PHYSICAL EXAM:  GA: NAD  Abdomen:  -  soft, non-distended, appropriate incisional tenderness  - Incision: clean/dry/intact       Vitals/Labs:  Vital Signs Last 24 Hrs  T(C): 36.9 (20 Aug 2017 19:24), Max: 36.9 (19 Aug 2017 21:58)  T(F): 98.5 (20 Aug 2017 19:24), Max: 98.5 (19 Aug 2017 21:58)  HR: 98 (20 Aug 2017 19:24) (70 - 98)  BP: 137/78 (20 Aug 2017 19:24) (103/59 - 145/85)  BP(mean): --  RR: 18 (20 Aug 2017 19:24) (12 - 18)  SpO2: 97% (20 Aug 2017 19:24) (95% - 100%)    I&O's Detail    19 Aug 2017 07:01  -  20 Aug 2017 07:00  --------------------------------------------------------  IN:    lactated ringers.: 2300 mL    Oral Fluid: 840 mL  Total IN: 3140 mL    OUT:    Voided: 750 mL  Total OUT: 750 mL    Total NET: 2390 mL      20 Aug 2017 07:01  -  20 Aug 2017 20:29  --------------------------------------------------------  IN:    lactated ringers.: 100 mL  Total IN: 100 mL    OUT:    Voided: 300 mL  Total OUT: 300 mL    Total NET: -200 mL                                11.4   6.11  )-----------( 272      ( 20 Aug 2017 06:00 )             36.5       08-20    140  |  103  |  10  ----------------------------<  122<H>  3.9   |  23  |  0.70    Ca    9.3      20 Aug 2017 06:00  Phos  3.2     08-20  Mg     2.0     08-20    TPro  6.7  /  Alb  3.6  /  TBili  0.8  /  DBili  0.2  /  AST  17  /  ALT  22  /  AlkPhos  72  08-20      CAPILLARY BLOOD GLUCOSE          LIVER FUNCTIONS - ( 20 Aug 2017 06:00 )  Alb: 3.6 g/dL / Pro: 6.7 g/dL / ALK PHOS: 72 u/L / ALT: 22 u/L / AST: 17 u/L / GGT: x             PT/INR - ( 20 Aug 2017 06:00 )   PT: 11.1 SEC;   INR: 0.99                  ASSESSMENT/PLAN: SIENNA RIVERO 52y Female s/p laparoscopic cholecystectomy  - Diet: regular  - Pain control   - Input and outputs   - DVT ppx  - OOB/incentive spirometry  - Discharge home tomorrow    MEDICATIONS  (STANDING):  enoxaparin Injectable 40 milliGRAM(s) SubCutaneous every 24 hours  lactated ringers. 1000 milliLiter(s) (50 mL/Hr) IV Continuous <Continuous>  gabapentin 300 milliGRAM(s) Oral three times a day  lisinopril 20 milliGRAM(s) Oral daily  amLODIPine   Tablet 10 milliGRAM(s) Oral daily    MEDICATIONS  (PRN):  ondansetron Injectable 4 milliGRAM(s) IV Push every 6 hours PRN Nausea and/or Vomiting  acetaminophen   Tablet. 650 milliGRAM(s) Oral every 6 hours PRN Mild Pain (1 - 3)  oxyCODONE    5 mG/acetaminophen 325 mG 1 Tablet(s) Oral every 4 hours PRN Moderate Pain (4 - 6)  oxyCODONE    5 mG/acetaminophen 325 mG 2 Tablet(s) Oral every 6 hours PRN Severe Pain (7 - 10)
B-Team Surgery Progress Note P [id number]    Patient is a 52y old  Female who presents with a chief complaint of abdominal pain (18 Aug 2017 03:29)      SUBJECTIVE:  The patient was seen and examined this morning during rounds. Denies pan. No complaints of nausea/vomiting. Ambulating and voiding.    OBJECTIVE:     ** VITAL SIGNS / I&O's **    Vital Signs Last 24 Hrs  T(C): 36.9 (20 Aug 2017 10:23), Max: 36.9 (19 Aug 2017 21:58)  T(F): 98.4 (20 Aug 2017 10:23), Max: 98.5 (19 Aug 2017 21:58)  HR: 77 (20 Aug 2017 10:23) (70 - 78)  BP: 131/84 (20 Aug 2017 10:23) (121/74 - 145/85)  BP(mean): --  RR: 17 (20 Aug 2017 10:23) (17 - 18)  SpO2: 95% (20 Aug 2017 10:23) (95% - 100%)      19 Aug 2017 07:01  -  20 Aug 2017 07:00  --------------------------------------------------------  IN:    lactated ringers.: 2300 mL    Oral Fluid: 840 mL  Total IN: 3140 mL    OUT:    Voided: 750 mL  Total OUT: 750 mL    Total NET: 2390 mL          ** PHYSICAL EXAM **    -- CONSTITUTIONAL: Alert, NAD.   -- ABDOMEN: soft, non-tender, non-distended    ** LABS **                          11.4   6.11  )-----------( 272      ( 20 Aug 2017 06:00 )             36.5     20 Aug 2017 06:00    140    |  103    |  10     ----------------------------<  122    3.9     |  23     |  0.70     Ca    9.3        20 Aug 2017 06:00  Phos  3.2       20 Aug 2017 06:00  Mg     2.0       20 Aug 2017 06:00    TPro  6.7    /  Alb  3.6    /  TBili  0.8    /  DBili  0.2    /  AST  17     /  ALT  22     /  AlkPhos  72     20 Aug 2017 06:00    PT/INR - ( 20 Aug 2017 06:00 )   PT: 11.1 SEC;   INR: 0.99            CAPILLARY BLOOD GLUCOSE            LIVER FUNCTIONS - ( 20 Aug 2017 06:00 )  Alb: 3.6 g/dL / Pro: 6.7 g/dL / ALK PHOS: 72 u/L / ALT: 22 u/L / AST: 17 u/L / GGT: x               MEDICATIONS  (STANDING):  enoxaparin Injectable 40 milliGRAM(s) SubCutaneous every 24 hours  lactated ringers. 1000 milliLiter(s) (100 mL/Hr) IV Continuous <Continuous>  gabapentin 300 milliGRAM(s) Oral three times a day  lisinopril 20 milliGRAM(s) Oral daily  amLODIPine   Tablet 10 milliGRAM(s) Oral daily    MEDICATIONS  (PRN):  ondansetron Injectable 4 milliGRAM(s) IV Push every 6 hours PRN Nausea and/or Vomiting  acetaminophen   Tablet. 650 milliGRAM(s) Oral every 6 hours PRN Mild Pain (1 - 3)  HYDROmorphone  Injectable 0.5 milliGRAM(s) IV Push every 4 hours PRN Moderate Pain (4 - 6)  HYDROmorphone  Injectable 1 milliGRAM(s) IV Push every 4 hours PRN Severe Pain (7 - 10)
B-Team Surgery Progress Note P [id number]    Patient is a 52y old  Female who presents with a chief complaint of abdominal pain (18 Aug 2017 03:29)      SUBJECTIVE:  The patient was seen and examined this morning during rounds. Denied pain. No complaints of nausea/vomiting.     OBJECTIVE:     ** VITAL SIGNS / I&O's **    Vital Signs Last 24 Hrs  T(C): 36.9 (19 Aug 2017 10:24), Max: 37.1 (18 Aug 2017 17:42)  T(F): 98.4 (19 Aug 2017 10:24), Max: 98.7 (18 Aug 2017 17:42)  HR: 74 (19 Aug 2017 10:24) (66 - 74)  BP: 117/73 (19 Aug 2017 10:24) (114/62 - 146/71)  BP(mean): --  RR: 17 (19 Aug 2017 10:24) (17 - 18)  SpO2: 95% (19 Aug 2017 10:24) (87% - 100%)      18 Aug 2017 07:01  -  19 Aug 2017 07:00  --------------------------------------------------------  IN:    IV PiggyBack: 300 mL    lactated ringers.: 2300 mL  Total IN: 2600 mL    OUT:    Voided: 600 mL  Total OUT: 600 mL    Total NET: 2000 mL      19 Aug 2017 07:01  -  19 Aug 2017 14:15  --------------------------------------------------------  IN:    lactated ringers.: 400 mL    Oral Fluid: 360 mL  Total IN: 760 mL    OUT:    Voided: 300 mL  Total OUT: 300 mL    Total NET: 460 mL          ** PHYSICAL EXAM **    -- CONSTITUTIONAL: Alert, NAD.   -- ABDOMEN: soft, non-tender, non-distended      ** LABS **                          11.6   6.38  )-----------( 249      ( 19 Aug 2017 06:30 )             36.6     19 Aug 2017 06:30    138    |  103    |  8      ----------------------------<  86     4.0     |  23     |  0.80     Ca    9.0        19 Aug 2017 06:30  Phos  2.8       19 Aug 2017 06:30  Mg     2.2       19 Aug 2017 06:30    TPro  6.6    /  Alb  3.4    /  TBili  1.4    /  DBili  x      /  AST  21     /  ALT  24     /  AlkPhos  72     19 Aug 2017 06:30    PT/INR - ( 18 Aug 2017 06:00 )   PT: 11.4 SEC;   INR: 1.02          PTT - ( 18 Aug 2017 06:00 )  PTT:41.0 SEC  CAPILLARY BLOOD GLUCOSE            LIVER FUNCTIONS - ( 19 Aug 2017 06:30 )  Alb: 3.4 g/dL / Pro: 6.6 g/dL / ALK PHOS: 72 u/L / ALT: 24 u/L / AST: 21 u/L / GGT: x               MEDICATIONS  (STANDING):  enoxaparin Injectable 40 milliGRAM(s) SubCutaneous every 24 hours  lactated ringers. 1000 milliLiter(s) (100 mL/Hr) IV Continuous <Continuous>  gabapentin 300 milliGRAM(s) Oral three times a day  lisinopril 20 milliGRAM(s) Oral daily  amLODIPine   Tablet 10 milliGRAM(s) Oral daily    MEDICATIONS  (PRN):  ondansetron Injectable 4 milliGRAM(s) IV Push every 6 hours PRN Nausea and/or Vomiting  acetaminophen   Tablet. 650 milliGRAM(s) Oral every 6 hours PRN Mild Pain (1 - 3)  HYDROmorphone  Injectable 0.5 milliGRAM(s) IV Push every 4 hours PRN Moderate Pain (4 - 6)  HYDROmorphone  Injectable 1 milliGRAM(s) IV Push every 4 hours PRN Severe Pain (7 - 10)

## 2017-09-11 ENCOUNTER — APPOINTMENT (OUTPATIENT)
Dept: TRAUMA SURGERY | Facility: CLINIC | Age: 52
End: 2017-09-11
Payer: COMMERCIAL

## 2017-09-11 VITALS
HEART RATE: 65 BPM | HEIGHT: 65 IN | SYSTOLIC BLOOD PRESSURE: 160 MMHG | BODY MASS INDEX: 36.65 KG/M2 | WEIGHT: 220 LBS | TEMPERATURE: 98.4 F | DIASTOLIC BLOOD PRESSURE: 100 MMHG

## 2017-09-11 DIAGNOSIS — Z82.49 FAMILY HISTORY OF ISCHEMIC HEART DISEASE AND OTHER DISEASES OF THE CIRCULATORY SYSTEM: ICD-10-CM

## 2017-09-11 DIAGNOSIS — Z82.0 FAMILY HISTORY OF EPILEPSY AND OTHER DISEASES OF THE NERVOUS SYSTEM: ICD-10-CM

## 2017-09-11 PROCEDURE — 99024 POSTOP FOLLOW-UP VISIT: CPT

## 2018-02-12 ENCOUNTER — EMERGENCY (EMERGENCY)
Facility: HOSPITAL | Age: 53
LOS: 1 days | Discharge: HOME | End: 2018-02-12
Attending: EMERGENCY MEDICINE | Admitting: EMERGENCY MEDICINE
Payer: SELF-PAY

## 2018-02-12 VITALS
HEART RATE: 81 BPM | OXYGEN SATURATION: 100 % | RESPIRATION RATE: 16 BRPM | DIASTOLIC BLOOD PRESSURE: 83 MMHG | TEMPERATURE: 98 F | SYSTOLIC BLOOD PRESSURE: 163 MMHG

## 2018-02-12 PROCEDURE — 99282 EMERGENCY DEPT VISIT SF MDM: CPT

## 2018-02-12 RX ORDER — IBUPROFEN 200 MG
600 TABLET ORAL ONCE
Qty: 0 | Refills: 0 | Status: COMPLETED | OUTPATIENT
Start: 2018-02-12 | End: 2018-02-12

## 2018-02-12 RX ADMIN — Medication 600 MILLIGRAM(S): at 09:52

## 2018-02-12 NOTE — ED PROVIDER NOTE - CHIEF COMPLAINT
The patient is a 52y Female complaining of The patient is a 52y Female complaining of right thigh pain.

## 2018-02-12 NOTE — ED PROVIDER NOTE - ATTENDING CONTRIBUTION TO CARE
Patient is a 53 yo F with hx of HTN here for right thigh pain. Patient reports she slipped in her kitchen and felt her muscle strain in her right thigh. Denies head trauma, loc, preceding symptoms. No skin changes or swelling. ROS neg.   VS noted  Gen. no acute distress, Non toxic   HEENT: EOMI, mmm  Lungs: CTAB/L no C/ W /R   CVS: RRR   Abd; Soft non tender, non distended   Ext: b/l hips with full ROM, no point tenderness. No overlaying swelling or erythema or bruising. Right thigh is non-tender.   Skin: no rash  Neuro AAOx3 non focal clear speech  a/p: right thigh pain - no erythema, swelling or tenderness on exam. Will give motrin and reassess.   - Robert HERRERA

## 2018-02-12 NOTE — ED PROVIDER NOTE - OBJECTIVE STATEMENT
This patient is a 52y female w PMHx HTN p/w 2 days of R thigh pain s/p fall. She states that she tripped over a broom in her kitchen and during the fall she strained her R leg, which has been sore since that event. She fell forward and denies landing on her hip or knee; denies any joint pain. No decreased ROM on exam. Her pain is isolated in the hamstring region of the right leg, and is not elicited w palpation. She states it is achy and gets sore when she stands and sits on it. 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

## 2018-08-21 ENCOUNTER — EMERGENCY (EMERGENCY)
Facility: HOSPITAL | Age: 53
LOS: 1 days | Discharge: HOME | End: 2018-08-21
Attending: EMERGENCY MEDICINE
Payer: COMMERCIAL

## 2018-08-21 VITALS
SYSTOLIC BLOOD PRESSURE: 169 MMHG | RESPIRATION RATE: 17 BRPM | HEIGHT: 63 IN | OXYGEN SATURATION: 96 % | TEMPERATURE: 98 F | WEIGHT: 270.07 LBS | HEART RATE: 79 BPM | DIASTOLIC BLOOD PRESSURE: 102 MMHG

## 2018-08-21 VITALS
SYSTOLIC BLOOD PRESSURE: 165 MMHG | RESPIRATION RATE: 18 BRPM | OXYGEN SATURATION: 98 % | DIASTOLIC BLOOD PRESSURE: 112 MMHG | TEMPERATURE: 98 F | HEART RATE: 75 BPM

## 2018-08-21 DIAGNOSIS — K08.409 PARTIAL LOSS OF TEETH, UNSPECIFIED CAUSE, UNSPECIFIED CLASS: Chronic | ICD-10-CM

## 2018-08-21 PROCEDURE — 99283 EMERGENCY DEPT VISIT LOW MDM: CPT

## 2018-08-21 RX ORDER — IBUPROFEN 200 MG
600 TABLET ORAL ONCE
Qty: 0 | Refills: 0 | Status: COMPLETED | OUTPATIENT
Start: 2018-08-21 | End: 2018-08-21

## 2018-08-21 RX ADMIN — Medication 300 MILLIGRAM(S): at 11:59

## 2018-08-21 RX ADMIN — Medication 600 MILLIGRAM(S): at 11:59

## 2018-08-21 NOTE — ED SUB INTERN NOTE - MEDICAL DECISION MAKING DETAILS
53 year old female with dental pain x 1 week.  Pain reproduced with palpation over tooth #15 with no peridental edema, erythema, or abscess. Patient stable at this time with no overt signs of infection.  Will d/c to dental clinic with PO ibuprofen and PO clindamycin since pt is PCN allergic.

## 2018-08-21 NOTE — ED ADULT NURSE NOTE - DISCHARGE TEACHING
Follow up with dental clinic (number provided) take motrin every 8 hrs as needed for pain, and clindamycin 300mg every 6 hrs for 7 days, return for new or worsening s/s

## 2018-08-21 NOTE — ED PROVIDER NOTE - PHYSICAL EXAMINATION
magi - no swelling ttp upper l molar - no fluctuance no dc no maloc clusion ub midline , no mastoid ttp

## 2018-08-21 NOTE — ED PROVIDER NOTE - OBJECTIVE STATEMENT
53 f with l p[ost molar pain x1 week woerse with temp change no fever no recent dental work - pain rad to ear not relieved w otc pain 8/10

## 2018-08-21 NOTE — ED ADULT NURSE NOTE - NSIMPLEMENTINTERV_GEN_ALL_ED
Implemented All Universal Safety Interventions:  [place] to call system. Call bell, personal items and telephone within reach. Instruct patient to call for assistance. Room bathroom lighting operational. Non-slip footwear when patient is off stretcher. Physically safe environment: no spills, clutter or unnecessary equipment. Stretcher in lowest position, wheels locked, appropriate side rails in place.

## 2018-08-21 NOTE — ED SUB INTERN NOTE - OBJECTIVE STATEMENT FT
53 year old female with PMH hypertension presents with dental pain x 1 week.  Pt reports sharp pain to her left upper molar that is worse with chewing over the past week.  Denies any fever, facial pain, otalgia, dysphagia, or odynophagia.  Tolerating PO solids and liquids.  Pt reports taking OTC Tylenol with little improvement. Denies any additional complaints.

## 2018-08-21 NOTE — ED ADULT NURSE NOTE - OBJECTIVE STATEMENT
53y female a&oX4, ambulatory, well in appearance presents to the ED c/o toothache X1 week. Pt complaining of toothache to L. upper side, no facial swelling noted, no bleeding noted to mouth, no loose teeth noted. Pt has been taking Tylenol with some relief at home. denies fever/chills 53y female a&oX4, ambulatory, well in appearance presents to the ED c/o toothache X1 week. Pt complaining of toothache to L. upper side, no facial swelling noted, no bleeding noted to mouth, no loose teeth noted. Pt states it is worse when chewing or drinking cold liquids. Pt has been taking Tylenol with some relief at home. denies fever/chills

## 2018-08-21 NOTE — ED PROVIDER NOTE - MEDICAL DECISION MAKING DETAILS
magi amador no constiturional s/s -- will give nsaids for pain , abx cannot exclude subclinincal apical abscess and dental fu

## 2021-08-17 NOTE — ED ADULT TRIAGE NOTE - TEMPERATURE IN CELSIUS (DEGREES C)
Central Prior Authorization Team   Phone: [phone]      PA Initiation    Medication: levothyroxine (SYNTHROID/LEVOTHROID) 112 MCG tablet - INITIATED  Insurance Company: [employer] - Phone [phone] Fax [phone]  Pharmacy Filling the Rx: [facility] - [place], [place] - [address]  Filling Pharmacy Phone: [phone]  Filling Pharmacy Fax: [phone]  Start Date: 8/17/2021    Initiated via [employer] web portal - reference # [id number]   36.8

## 2021-12-21 NOTE — ED PROVIDER NOTE - MEDICAL DECISION MAKING DETAILS
Patient here for COVID test, as brother tested positive. Patient is autistic and mom denies him having any symptoms. 52y female w PMHx HTN p/w 2 days of R thigh pain s/p fall. Denies trauma to the affected region; her pain is isolated to the R hamstring. No decreased ROM, no joint pain, no f/c/n/v/d, all other ROS negative. She described achy pain with standing and sitting but no loss of ambulatory ability. High susp for msk strain, lack of joint pain or trauma or risk fx makes likelihood of hip or knee very low, imaging unnecessary. Will admin motrin and reassess 52y female w PMHx HTN p/w 2 days of R thigh pain s/p fall. Denies trauma to the affected region; her pain is isolated to the R hamstring. No decreased ROM, no joint pain, no f/c/n/v/d, all other ROS negative. She described achy pain with standing and sitting but no loss of ambulatory ability. High susp for msk strain, lack of joint pain or trauma or risk fx makes likelihood of hip or knee very low, imaging unnecessary. Will admin motrin and reassess.

## 2022-08-17 ENCOUNTER — APPOINTMENT (OUTPATIENT)
Dept: INTERNAL MEDICINE | Facility: CLINIC | Age: 57
End: 2022-08-17

## 2022-08-17 VITALS
HEART RATE: 79 BPM | DIASTOLIC BLOOD PRESSURE: 78 MMHG | WEIGHT: 234 LBS | BODY MASS INDEX: 44.75 KG/M2 | SYSTOLIC BLOOD PRESSURE: 143 MMHG | TEMPERATURE: 97.9 F | OXYGEN SATURATION: 97 % | HEIGHT: 60.5 IN

## 2022-08-17 DIAGNOSIS — Z00.00 ENCOUNTER FOR GENERAL ADULT MEDICAL EXAMINATION W/OUT ABNORMAL FINDINGS: ICD-10-CM

## 2022-08-17 DIAGNOSIS — Z81.8 FAMILY HISTORY OF OTHER MENTAL AND BEHAVIORAL DISORDERS: ICD-10-CM

## 2022-08-17 DIAGNOSIS — Z82.49 FAMILY HISTORY OF ISCHEMIC HEART DISEASE AND OTHER DISEASES OF THE CIRCULATORY SYSTEM: ICD-10-CM

## 2022-08-17 DIAGNOSIS — M25.552 PAIN IN LEFT HIP: ICD-10-CM

## 2022-08-17 DIAGNOSIS — Z82.3 FAMILY HISTORY OF STROKE: ICD-10-CM

## 2022-08-17 DIAGNOSIS — Q15.9 CONGENITAL MALFORMATION OF EYE, UNSPECIFIED: ICD-10-CM

## 2022-08-17 LAB
BILIRUB UR QL STRIP: NORMAL
CLARITY UR: CLEAR
COLLECTION METHOD: NORMAL
GLUCOSE UR-MCNC: NORMAL
HCG UR QL: 0.2 EU/DL
HGB UR QL STRIP.AUTO: NORMAL
KETONES UR-MCNC: NORMAL
LEUKOCYTE ESTERASE UR QL STRIP: NORMAL
NITRITE UR QL STRIP: NORMAL
PH UR STRIP: 6.5
PROT UR STRIP-MCNC: NORMAL
SP GR UR STRIP: 1.02

## 2022-08-17 PROCEDURE — 99386 PREV VISIT NEW AGE 40-64: CPT | Mod: 25

## 2022-08-17 PROCEDURE — 36415 COLL VENOUS BLD VENIPUNCTURE: CPT

## 2022-08-17 PROCEDURE — 81003 URINALYSIS AUTO W/O SCOPE: CPT | Mod: QW

## 2022-08-17 RX ORDER — LATANOPROST/PF 0.005 %
0.01 DROPS OPHTHALMIC (EYE) DAILY
Qty: 1 | Refills: 0 | Status: ACTIVE | COMMUNITY
Start: 2022-08-17

## 2022-08-17 NOTE — REVIEW OF SYSTEMS
[FreeTextEntry2] : hot flashes [FreeTextEntry8] : nocturia x 2-3 [FreeTextEntry9] : left hip pain [FreeTextEntry1] : (+) polyuria, polydipsia

## 2022-08-17 NOTE — ASSESSMENT
[FreeTextEntry1] : HM - pt thinks her vaccines are UTD, had COVID vaccines, will refer for c-scope and try to get old records since pt unclear about a lot of her history.

## 2022-08-17 NOTE — HEALTH RISK ASSESSMENT
[With Family] : lives with family [Employed] : employed [# Of Children ___] : has [unfilled] children [Fully functional (bathing, dressing, toileting, transferring, walking, feeding)] : Fully functional (bathing, dressing, toileting, transferring, walking, feeding) [Fully functional (using the telephone, shopping, preparing meals, housekeeping, doing laundry, using] : Fully functional and needs no help or supervision to perform IADLs (using the telephone, shopping, preparing meals, housekeeping, doing laundry, using transportation, managing medications and managing finances) [Never] : Never [No] : In the past 12 months have you used drugs other than those required for medical reasons? 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

## 2022-08-17 NOTE — HISTORY OF PRESENT ILLNESS
[FreeTextEntry1] : needs new PCP [de-identified] : 58 yo F, here for check up, h/o HTN and DM, only put on metformin last year. Does not know much about her numbers. 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 Did see nutrition re DM and tries to follow diet but still makes rice on Sunday, eats fish and veggies. \eliseo Was referred for c-scope but never went.\par Had previous PCP but left, no reason given.\par Sees gyne regularly, gets paps and mammos, no h/o abnormals.

## 2022-08-17 NOTE — PHYSICAL EXAM
[Normal Sclera/Conjunctiva] : normal sclera/conjunctiva [Normal TMs] : both tympanic membranes were normal [No Varicosities] : no varicosities [Pedal Pulses Present] : the pedal pulses are present [No Edema] : there was no peripheral edema [No Extremity Clubbing/Cyanosis] : no extremity clubbing/cyanosis [No Rash] : no rash [No Skin Lesions] : no skin lesions [No Focal Deficits] : no focal deficits [Normal Gait] : normal gait [Normal] : affect was normal and insight and judgment were intact

## 2022-08-19 LAB
ALBUMIN SERPL ELPH-MCNC: 4.4 G/DL
ALP BLD-CCNC: 94 U/L
ALT SERPL-CCNC: 36 U/L
ANION GAP SERPL CALC-SCNC: 13 MMOL/L
AST SERPL-CCNC: 31 U/L
BILIRUB SERPL-MCNC: 0.8 MG/DL
BUN SERPL-MCNC: 17 MG/DL
CALCIUM SERPL-MCNC: 9.9 MG/DL
CHLORIDE SERPL-SCNC: 103 MMOL/L
CHOLEST SERPL-MCNC: 137 MG/DL
CO2 SERPL-SCNC: 26 MMOL/L
CREAT SERPL-MCNC: 0.72 MG/DL
CREAT SPEC-SCNC: 145 MG/DL
EGFR: 97 ML/MIN/1.73M2
ESTIMATED AVERAGE GLUCOSE: 148 MG/DL
GLUCOSE SERPL-MCNC: 110 MG/DL
HBA1C MFR BLD HPLC: 6.8 %
HDLC SERPL-MCNC: 45 MG/DL
LDLC SERPL CALC-MCNC: 62 MG/DL
MICROALBUMIN 24H UR DL<=1MG/L-MCNC: <1.2 MG/DL
MICROALBUMIN/CREAT 24H UR-RTO: NORMAL MG/G
NONHDLC SERPL-MCNC: 92 MG/DL
POTASSIUM SERPL-SCNC: 3.7 MMOL/L
PROT SERPL-MCNC: 7.1 G/DL
SODIUM SERPL-SCNC: 142 MMOL/L
TRIGL SERPL-MCNC: 152 MG/DL
TSH SERPL-ACNC: 1.95 UIU/ML

## 2022-08-31 ENCOUNTER — APPOINTMENT (OUTPATIENT)
Dept: FAMILY MEDICINE | Facility: CLINIC | Age: 57
End: 2022-08-31

## 2022-09-06 RX ORDER — LISINOPRIL 30 MG/1
TABLET ORAL
Refills: 0 | Status: DISCONTINUED | COMMUNITY
End: 2022-09-06

## 2022-09-06 RX ORDER — HYDROCHLOROTHIAZIDE 12.5 MG/1
TABLET ORAL
Refills: 0 | Status: DISCONTINUED | COMMUNITY
End: 2022-09-06

## 2022-09-06 RX ORDER — AMLODIPINE BESYLATE 5 MG/1
TABLET ORAL
Refills: 0 | Status: DISCONTINUED | COMMUNITY
End: 2022-09-06

## 2022-09-07 ENCOUNTER — NON-APPOINTMENT (OUTPATIENT)
Age: 57
End: 2022-09-07

## 2022-09-07 ENCOUNTER — APPOINTMENT (OUTPATIENT)
Dept: INTERNAL MEDICINE | Facility: CLINIC | Age: 57
End: 2022-09-07

## 2022-09-07 VITALS
DIASTOLIC BLOOD PRESSURE: 85 MMHG | HEIGHT: 60.5 IN | WEIGHT: 235 LBS | BODY MASS INDEX: 44.94 KG/M2 | SYSTOLIC BLOOD PRESSURE: 149 MMHG | OXYGEN SATURATION: 96 % | HEART RATE: 74 BPM | TEMPERATURE: 97.9 F

## 2022-09-07 DIAGNOSIS — Z98.890 OTHER SPECIFIED POSTPROCEDURAL STATES: ICD-10-CM

## 2022-09-07 PROCEDURE — 99214 OFFICE O/P EST MOD 30 MIN: CPT

## 2022-09-07 NOTE — HISTORY OF PRESENT ILLNESS
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

## 2022-11-10 NOTE — H&P ADULT - NSHPPOADEEPVENOUSTHROMB_GEN_A_CORE
NEPHROLOGY PROGRESS NOTE   Marija Gonsalez 57 y o  male MRN: [id number]  Unit/Bed#: -01 Encounter: [id number]      HPI/24hr EVENTS:    -70-year-old male past medical history of schizophrenia, presented with generalized weakness/fatigue/ambulatory dysfunction, unintentional weight loss of 25 lb over 2 months   Found to have pulmonary embolism, DVT, new lung mass concerning for malignancy with possible metastases   Nephrology was consulted for management of hypercalcemia    -no acute events overnight    ASSESSMENT/PLAN:    Hypercalcemia  -corrected calcium on admission was 11 5, received IV fluid with downtrending to a yojana of 10 3 on 11/03, now improved to 10 9 when corrected  -PTH 9 8, vitamin-D 25-hydroxy 20 7  -MRI lumbar spine with scattered enhancing osseous metastases throughout the lumbar spine  -CTA chest PE study with metastatic disease of bilateral ribs, 3 6 cm thick-walled cavitary right upper lobe tumor, 2 8 cm necrotic left lobe tumor  -suspect hypercalcemia related to underlying malignancy, patient does report he takes oral calcium and vitamin-D supplements at home, hold further vitamin-D and calcium supplements while inpatient  -status post Zometa 4 mg IV on 11/07  -IV fluids discontinued     Lung mass  -CTA as noted above  -status post IR biopsy 11/8, awaiting pathologies  -palliative/Hematology/Oncology/pulmonology consulted     Renal function  -unknown baseline  -creatinine on admission is 0 78, most recently 1 14     Blood pressure  -Recent blood pressures trends are acceptable  -not on any antihypertensives currently     Acute hypoxic respiratory failure  -prior requiring 2 L nasal cannula, currently on room air  -pulmonology consulted  -IR consult for possible thoracentesis, no drainable collection noted not performed     Anemia  -Hgb 7 2  -Recommend transfuse for goal greater than 7 per primary team  -hematology/oncology consult  -iron panel from 11/03:  Ferritin greater than 1000, iron sat 37     Lower extremity edema  -has bilateral lower extremity DVT on Doppler  -currently on heparin infusion     Pulmonary embolism  -CTA PE study with few small subsegmental emboli in the right middle lobe right lower lobe and inferior lingula  -currently on heparin infusion per primary team     Elevated serum bicarbonate  -suspect 2nd to volume contraction  -downtrending after IV fluid     SIRS  -meeting SIRS criteria on admission unclear source  -currently on Zosyn     Advanced care planning  -evaluated by neuropsych, deemed to not have capacity to make fully informed medical decisions     Additional Medical Problems:  Schizophrenia    SUBJECTIVE:  No acute complaints, reports he feels well, reports his appetite is at baseline, denies nausea/vomiting/diarrhea/dysuria    ROS:  Review of Systems   Constitutional: Negative  HENT: Negative  Respiratory: Negative  Cardiovascular: Negative  Gastrointestinal: Negative  Genitourinary: Negative  Musculoskeletal: Negative  All other systems reviewed and are negative  A complete 10 point review of systems was performed and found to be negative unless otherwise noted above or in the HPI  OBJECTIVE:  Current Weight: Weight - Scale: 72 6 kg (160 lb)  Vitals:    11/09/22 1433 11/09/22 2215 11/10/22 0803 11/10/22 1101   BP: 102/63 101/55 99/57 103/62   BP Location:    Left arm   Pulse: 105 (!) 111 (!) 112 (!) 109   Resp:       Temp: 98 1 °F (36 7 °C) 98 1 °F (36 7 °C) 98 °F (36 7 °C)    TempSrc:       SpO2: 95% 95% 93% 95%   Weight:       Height:           Intake/Output Summary (Last 24 hours) at 11/10/2022 1111  Last data filed at 11/9/2022 2354  Gross per 24 hour   Intake 480 ml   Output 425 ml   Net 55 ml     Physical Exam  Vitals and nursing note reviewed  Constitutional:       General: He is not in acute distress  Appearance: He is underweight  He is ill-appearing (Chronically)  He is not toxic-appearing or diaphoretic        Comments: Sleeping in bed, awakens to verbal stimuli   HENT:      Head: Normocephalic and atraumatic  Nose: Nose normal       Mouth/Throat:      Mouth: Mucous membranes are moist    Eyes:      General: No scleral icterus  Cardiovascular:      Rate and Rhythm: Normal rate and regular rhythm  Pulses: Normal pulses  Heart sounds: Normal heart sounds  Pulmonary:      Effort: Pulmonary effort is normal  No respiratory distress  Breath sounds: Normal breath sounds  No stridor  No wheezing or rales  Abdominal:      General: Abdomen is flat  There is no distension  Palpations: Abdomen is soft  Tenderness: There is no abdominal tenderness  Musculoskeletal:      Cervical back: Neck supple  Right lower leg: No edema  Left lower leg: No edema  Skin:     General: Skin is warm and dry  Capillary Refill: Capillary refill takes less than 2 seconds  Neurological:      General: No focal deficit present  Mental Status: He is alert     Psychiatric:         Mood and Affect: Mood normal           Medications:    Current Facility-Administered Medications:   •  acetaminophen (TYLENOL) tablet 650 mg, 650 mg, Oral, Q8H PRN, Zoey Klein PA-C, 650 mg at 11/08/22 2040  •  heparin (porcine) 25,000 units in 0 45% NaCl 250 mL infusion (premix), 3-30 Units/kg/hr (Order-Specific), Intravenous, Titrated, Helen Barnard MD, Last Rate: 15 4 mL/hr at 11/10/22 0640, 22 Units/kg/hr at 11/10/22 0640  •  influenza vaccine, recombinant, quadrivalent (FLUBLOK) IM injection 0 5 mL, 0 5 mL, Intramuscular, Prior to discharge, Zoey Klein PA-C  •  insulin glargine (LANTUS) subcutaneous injection 8 Units 0 08 mL, 8 Units, Subcutaneous, HS, Haleigh Segovia MD, 8 Units at 11/09/22 2109  •  insulin lispro (HumaLOG) 100 units/mL subcutaneous injection 1-5 Units, 1-5 Units, Subcutaneous, TID AC, 1 Units at 11/09/22 1158 **AND** Fingerstick Glucose (POCT), , , TID AC, Ree L Tamir Edmonds PA-C  •  insulin lispro (HumaLOG) 100 units/mL subcutaneous injection 1-5 Units, 1-5 Units, Subcutaneous, HS, Jennifer Chen PA-C, 1 Units at 11/08/22 2300  •  insulin lispro (HumaLOG) 100 units/mL subcutaneous injection 3 Units, 3 Units, Subcutaneous, TID With Meals, Jennifer Chen PA-C, 3 Units at 11/10/22 0914  •  lidocaine (LIDODERM) 5 % patch 1 patch, 1 patch, Topical, Daily, Stella Uriarte MD, 1 patch at 11/08/22 5852  •  lidocaine (LIDODERM) 5 % patch 1 patch, 1 patch, Topical, Daily, Stella Uriarte MD, 1 patch at 11/08/22 2162  •  nicotine (NICODERM CQ) 21 mg/24 hr TD 24 hr patch 1 patch, 1 patch, Transdermal, Daily, Jennifer Chen PA-C, 1 patch at 11/10/22 0914  •  OLANZapine (ZyPREXA) tablet 5 mg, 5 mg, Oral, HS, Stella Uriarte MD, 5 mg at 11/09/22 2108  •  ondansetron (ZOFRAN) injection 4 mg, 4 mg, Intravenous, Q6H PRN, Jennifer Chen PA-C  •  oxyCODONE (ROXICODONE) IR tablet 10 mg, 10 mg, Oral, Q4H PRN, Gi Watters MD, 10 mg at 11/10/22 0088  •  oxyCODONE (ROXICODONE) IR tablet 5 mg, 5 mg, Oral, Q6H PRN, Gi Watters MD  •  piperacillin-tazobactam (ZOSYN) IVPB 3 375 g, 3 375 g, Intravenous, Q6H, Gi Watters MD, Last Rate: 200 mL/hr at 11/09/22 1200, 3 375 g at 11/10/22 0519  •  senna (SENOKOT) tablet 8 6 mg, 1 tablet, Oral, HS PRN, Jennifer Chen PA-C    Laboratory Results:  Results from last 7 days   Lab Units 11/10/22  0509 11/09/22  0353 11/08/22  0522 11/07/22  0602 11/06/22  0424 11/05/22  1305 11/04/22  0254   WBC Thousand/uL 13 35* 12 11* 14 63* 15 59* 14 39* 13 54* 17 51*   HEMOGLOBIN g/dL 7 2* 8 1* 8 0* 8 5* 8 1* 8 9* 8 9*   HEMATOCRIT % 21 9* 24 0* 24 4* 25 1* 24 0* 26 3* 26 9*   PLATELETS Thousands/uL 135* 158 184 214 196 213 195   POTASSIUM mmol/L 3 6 2 8* 3 6 3 2* 3 1* 3 1* 3 7   CHLORIDE mmol/L 101 98 100 98 99 96 99   CO2 mmol/L 32 34* 35* 33* 35* 36* 37*   BUN mg/dL 24 26* 21 13 10 11 14   CREATININE mg/dL 1 14 1 22 1 17 0 83 0 70 0 75 0 67   CALCIUM mg/dL 9 2 9 9 11 6* 11 1* 9 7 9 5 9 0   MAGNESIUM mg/dL  --   --   --  1 5*  --  1 6 1 8   PHOSPHORUS mg/dL  --   --   --   --   --   --  3 2       I have personally reviewed the blood work as stated above and in my note  I have personally reviewed internal medicine note  no

## 2022-12-07 ENCOUNTER — APPOINTMENT (OUTPATIENT)
Dept: INTERNAL MEDICINE | Facility: CLINIC | Age: 57
End: 2022-12-07

## 2023-01-24 ENCOUNTER — NON-APPOINTMENT (OUTPATIENT)
Age: 58
End: 2023-01-24

## 2023-02-01 ENCOUNTER — APPOINTMENT (OUTPATIENT)
Dept: INTERNAL MEDICINE | Facility: CLINIC | Age: 58
End: 2023-02-01
Payer: MEDICAID

## 2023-02-01 VITALS
DIASTOLIC BLOOD PRESSURE: 100 MMHG | HEIGHT: 60.5 IN | BODY MASS INDEX: 44.37 KG/M2 | SYSTOLIC BLOOD PRESSURE: 160 MMHG | OXYGEN SATURATION: 99 % | WEIGHT: 232 LBS | TEMPERATURE: 97.3 F | HEART RATE: 73 BPM

## 2023-02-01 DIAGNOSIS — Z23 ENCOUNTER FOR IMMUNIZATION: ICD-10-CM

## 2023-02-01 DIAGNOSIS — E66.01 MORBID (SEVERE) OBESITY DUE TO EXCESS CALORIES: ICD-10-CM

## 2023-02-01 DIAGNOSIS — Z00.00 ENCOUNTER FOR GENERAL ADULT MEDICAL EXAMINATION W/OUT ABNORMAL FINDINGS: ICD-10-CM

## 2023-02-01 LAB — HBA1C MFR BLD HPLC: 6.7

## 2023-02-01 PROCEDURE — 90715 TDAP VACCINE 7 YRS/> IM: CPT

## 2023-02-01 PROCEDURE — 90686 IIV4 VACC NO PRSV 0.5 ML IM: CPT

## 2023-02-01 PROCEDURE — 83036 HEMOGLOBIN GLYCOSYLATED A1C: CPT | Mod: QW

## 2023-02-01 PROCEDURE — 90472 IMMUNIZATION ADMIN EACH ADD: CPT

## 2023-02-01 PROCEDURE — 99214 OFFICE O/P EST MOD 30 MIN: CPT | Mod: 25

## 2023-02-01 PROCEDURE — G0008: CPT

## 2023-02-01 NOTE — HISTORY OF PRESENT ILLNESS
[FreeTextEntry1] : f/u [de-identified] : 56 yo F with PMH of T2DM and HTN presenting for follow up visit. Regarding her meds, pt reports often missing her nighttime meds, statin, CCB and gabapentin, when she works late, was not sure she should take them at [place]. Recently returned from her native country, [place], was caring for her sick sister, diet was on/off. Gets no exercise. \par \par

## 2023-02-01 NOTE — PHYSICAL EXAM
[No Acute Distress] : no acute distress [Well-Appearing] : well-appearing [No Respiratory Distress] : no respiratory distress  [No Varicosities] : no varicosities [Pedal Pulses Present] : the pedal pulses are present [No Edema] : there was no peripheral edema [None] : no ulcers in either foot were found [] : both feet [de-identified] : obese [TWNoteComboBox3] : +2 [TWNoteComboBox4] : +2

## 2023-02-21 NOTE — ED PROVIDER NOTE - TEMPLATE, MLM
Patient starting cardiac rehab classes 2/22/23, 4 pm class. DM, instructed to bring glucometer.
Abdominal Pain, N/V/D

## 2023-05-03 ENCOUNTER — RESULT CHARGE (OUTPATIENT)
Age: 58
End: 2023-05-03

## 2023-05-03 ENCOUNTER — APPOINTMENT (OUTPATIENT)
Dept: INTERNAL MEDICINE | Facility: CLINIC | Age: 58
End: 2023-05-03
Payer: MEDICAID

## 2023-05-03 VITALS
HEART RATE: 75 BPM | BODY MASS INDEX: 43.8 KG/M2 | WEIGHT: 229 LBS | OXYGEN SATURATION: 98 % | HEIGHT: 60.5 IN | SYSTOLIC BLOOD PRESSURE: 136 MMHG | TEMPERATURE: 97.3 F | DIASTOLIC BLOOD PRESSURE: 81 MMHG

## 2023-05-03 DIAGNOSIS — M79.641 PAIN IN RIGHT HAND: ICD-10-CM

## 2023-05-03 DIAGNOSIS — Z12.11 ENCOUNTER FOR SCREENING FOR MALIGNANT NEOPLASM OF COLON: ICD-10-CM

## 2023-05-03 PROCEDURE — 99214 OFFICE O/P EST MOD 30 MIN: CPT | Mod: 25

## 2023-05-03 PROCEDURE — 83036 HEMOGLOBIN GLYCOSYLATED A1C: CPT | Mod: QW

## 2023-05-03 NOTE — HISTORY OF PRESENT ILLNESS
[FreeTextEntry1] : f/u [de-identified] : 58 yo F with PMH of T2DM and HTN presenting for follow up visit. Reporting stiffness of left knee, swells up, not painful. Does a lot of standing at work. Wears supportive shoes. Not taking anything for this.\par Also reports occasional pain in her palms, and swollen hands when she wakes up in the morning, a few minutes of stiffness, not daily. \par Regarding her meds, pt reports adherence now, doing well. BPs at home fluctuate, some highs and some lows.\par Did not make appt for c-scope.\par Thinks she has ophtho appt coming up. \par Takes the gabapentin as needed, no longer has the left sided back muscle pain for which this was prescribed. \par \par \par \par

## 2023-05-03 NOTE — PHYSICAL EXAM
[Pedal Pulses Present] : the pedal pulses are present [No Edema] : there was no peripheral edema [No Rash] : no rash [Speech Grossly Normal] : speech grossly normal [Normal Mood] : the mood was normal [Normal] : affect was normal and insight and judgment were intact [Comprehensive Foot Exam Normal] : Right and left foot were examined and both feet are normal. No ulcers in either foot. Toes are normal and with full ROM.  Normal tactile sensation with monofilament testing throughout both feet [de-identified] : obesity [de-identified] : righe knee with warmth and minimal swelling superior/lateral aspect, full ROM; pea-sized cyst over right radial styloid; bilat hands without tenderness or swelling [de-identified] : some callous on plantar aspects bilat over heels

## 2023-05-11 ENCOUNTER — APPOINTMENT (OUTPATIENT)
Dept: ORTHOPEDIC SURGERY | Facility: CLINIC | Age: 58
End: 2023-05-11

## 2023-05-15 ENCOUNTER — APPOINTMENT (OUTPATIENT)
Dept: ORTHOPEDIC SURGERY | Facility: CLINIC | Age: 58
End: 2023-05-15
Payer: MEDICAID

## 2023-05-15 PROCEDURE — 73564 X-RAY EXAM KNEE 4 OR MORE: CPT | Mod: RT

## 2023-05-15 PROCEDURE — 99204 OFFICE O/P NEW MOD 45 MIN: CPT | Mod: 25

## 2023-05-15 PROCEDURE — 20610 DRAIN/INJ JOINT/BURSA W/O US: CPT | Mod: RT

## 2023-05-15 NOTE — PROCEDURE
[de-identified] : Aspiration & Injection: right knee joint.\par Indication: effusion arthritis of the right knee\par \par A discussion was had with the patient regarding this procedure and all questions were answered. All risks, benefits and alternatives were discussed. These included but were not limited to bleeding, infection, allergic reaction and reaccumulation of fluid. Alcohol was used to clean the skin, and betadine was used to sterilize and prep the area in the supero-lateral aspect of the right knee. Ethyl chloride spray was then used as a topical anesthetic. An 18-gauge needle was used to aspirate the knee joint and approximately 13 cc of inflammatory fluid was aspirated from the right knee without complication. In addition, following the aspiration, an injection of 4cc of 1% lidocaine and 1cc of 40mg/ml methylprednisolone also inserted into the knee via the same needle. A sterile bandage was then applied. The patient tolerated the procedure well.\par

## 2023-05-15 NOTE — ADDENDUM
[FreeTextEntry1] : I, Marly Hammer, have acted as a scribe and documented the above information for Dr. Carter on 05/15/2023.\par \par All medical record entries made by the Scribe were at my, Dr. Raul MD , direction and personally dictated by me on 05/15/2023 . I have reviewed the chart and agree that the record accurately reflects my personal performance of the history, physical exam, assessment and plan. I have also personally directed, reviewed, and agreed with the chart.\par

## 2023-05-15 NOTE — HISTORY OF PRESENT ILLNESS
[de-identified] : 57 year old female presents today with right knee stiffness x 4 weeks. No injury reported. Patient states that she has no pain in the knee just stiffness and swelling. C/O buckling. She is not taking pain medication. 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 She recalls having similar issue year ago but not this severe. Denies catching, locking,  numbness or tingling. \par \par The patient's past medical history, past surgical history, medications and allergies were reviewed by me today with the patient and documented accordingly. In addition, the patient's family and social history, which were noncontributory to this visit, were reviewed also.

## 2023-05-15 NOTE — DISCUSSION/SUMMARY
[de-identified] : 56 yo female with right knee joint effusion/arthritis\par \par I discussed the treatment of degenerative arthritis with the patient at length today, as well as the chronic degenerative process and likely future progression of the disease. Pain may be related to the bony degenerative changes seen on XR imaging, or the associated degeneration to the soft tissues within the knee joint, including cartilage, meniscus, or ligamentous structures. I described the spectrum of treatment options available including nonoperative modalities to total joint arthroplasty. Noninvasive and nonoperative treatment modalities include weight reduction, activity modification with low impact exercise, PRN use of acetaminophen or anti-inflammatory medication, natural anti-inflammatory supplements, glucosamine/chondroitin, and physical therapy (for strengthening and conditioning). More invasive treatments can include injection therapies; including cortisone, hyaluronic acid (visco-supplementation), or platelet-rich-plasma (PRP) injections. Definitive treatment could also include future total joint arthroplasty if symptoms persist and cause prolonged disability or interference with activities of daily living.\par \par The risks and benefits of each treatment option was discussed and all questions were answered. At this time recommendations are for conservative and symptomatic care as detailed above with impact loading activity restriction, low impact exercise and avoidance of strenuous activity.\par \par Other recommendations include OTC NSAIDs or acetaminophen (if tolerated/able), with application of ice to the knee 2-3x daily for 20 minutes or after periods of activity.\par \par Given inflammatory pain on today's evaluation, therapeutic aspiration and injection of cortisone provided under sterile conditions. Patient tolerated procedure without complication.\par \par Followup: As needed.\par

## 2023-05-15 NOTE — PHYSICAL EXAM
[de-identified] : Oriented to time, place, person\par Mood: Normal\par Affect: Normal\par Appearance: Healthy, well appearing, no acute distress.\par Gait: Normal\par Assistive Devices: None\par \par Right knee exam:\par Skin: Clean, dry, intact\par Inspection: No obvious malalignment, no masses, mild swelling, moderate effusion, valgus deformity.\par Pulses: 2+ DP/PT pulses\par ROM: 0-120 degrees of flexion with pain. No pain with deep knee flexion/extension.\par Tenderness: No MJLT. LJLT. No pain over the patella facets. No pain to the quadriceps tendon. No pain to the patella tendon. No posterior knee tenderness.\par Stability: Stable to varus, valgus. Negative lachman testing. Negative anterior drawer, negative posterior drawer.\par Strength: 5/5 Q/H/TA/GS/EHL, without atrophy\par Neuro: In tact to light touch throughout, DTR's normal\par Additional tests: Negative McMurrays test, Negative patellar grind test.\par  [de-identified] : The following radiographs were ordered and read by me during this patients visit. I reviewed each radiograph in detail with the patient and discussed the findings as highlighted below.\par \par 4 views of the Rightknee were obtained today 05/15/2023, that show Moderate medial, mild lateral, and mild patellofemoral, varus deformity \par

## 2023-05-18 ENCOUNTER — NON-APPOINTMENT (OUTPATIENT)
Age: 58
End: 2023-05-18

## 2023-05-18 ENCOUNTER — APPOINTMENT (OUTPATIENT)
Dept: OPHTHALMOLOGY | Facility: CLINIC | Age: 58
End: 2023-05-18
Payer: MEDICAID

## 2023-05-18 PROCEDURE — 92020 GONIOSCOPY: CPT

## 2023-05-18 PROCEDURE — 92015 DETERMINE REFRACTIVE STATE: CPT | Mod: NC

## 2023-05-18 PROCEDURE — 92004 COMPRE OPH EXAM NEW PT 1/>: CPT

## 2023-06-08 ENCOUNTER — APPOINTMENT (OUTPATIENT)
Dept: OPHTHALMOLOGY | Facility: CLINIC | Age: 58
End: 2023-06-08

## 2023-06-15 ENCOUNTER — APPOINTMENT (OUTPATIENT)
Dept: OPHTHALMOLOGY | Facility: CLINIC | Age: 58
End: 2023-06-15

## 2023-08-09 ENCOUNTER — APPOINTMENT (OUTPATIENT)
Dept: INTERNAL MEDICINE | Facility: CLINIC | Age: 58
End: 2023-08-09
Payer: MEDICAID

## 2023-08-09 VITALS
BODY MASS INDEX: 42.84 KG/M2 | HEART RATE: 73 BPM | OXYGEN SATURATION: 98 % | SYSTOLIC BLOOD PRESSURE: 150 MMHG | DIASTOLIC BLOOD PRESSURE: 94 MMHG | HEIGHT: 60.5 IN | TEMPERATURE: 98.8 F | WEIGHT: 224 LBS

## 2023-08-09 PROCEDURE — 99214 OFFICE O/P EST MOD 30 MIN: CPT | Mod: 25

## 2023-08-11 LAB
ALBUMIN SERPL ELPH-MCNC: 4.7 G/DL
ALP BLD-CCNC: 97 U/L
ALT SERPL-CCNC: 30 U/L
ANION GAP SERPL CALC-SCNC: 14 MMOL/L
AST SERPL-CCNC: 26 U/L
BILIRUB SERPL-MCNC: 1.1 MG/DL
BUN SERPL-MCNC: 16 MG/DL
CALCIUM SERPL-MCNC: 9.9 MG/DL
CHLORIDE SERPL-SCNC: 104 MMOL/L
CHOLEST SERPL-MCNC: 120 MG/DL
CO2 SERPL-SCNC: 24 MMOL/L
CREAT SERPL-MCNC: 0.82 MG/DL
CREAT SPEC-SCNC: 102 MG/DL
EGFR: 83 ML/MIN/1.73M2
ESTIMATED AVERAGE GLUCOSE: 154 MG/DL
GLUCOSE SERPL-MCNC: 115 MG/DL
HBA1C MFR BLD HPLC: 7 %
HDLC SERPL-MCNC: 46 MG/DL
LDLC SERPL CALC-MCNC: 52 MG/DL
MICROALBUMIN 24H UR DL<=1MG/L-MCNC: <1.2 MG/DL
MICROALBUMIN/CREAT 24H UR-RTO: NORMAL MG/G
NONHDLC SERPL-MCNC: 74 MG/DL
POTASSIUM SERPL-SCNC: 3.9 MMOL/L
PROT SERPL-MCNC: 7.6 G/DL
SODIUM SERPL-SCNC: 142 MMOL/L
TRIGL SERPL-MCNC: 125 MG/DL
VIT B12 SERPL-MCNC: 671 PG/ML

## 2023-08-18 NOTE — HISTORY OF PRESENT ILLNESS
[FreeTextEntry1] : f/u [de-identified] : 58 yo F with PMH of T2DM and HTN presenting for follow up visit.  Reporting stiffness of right knee,no longer swells up, not painful.  Also reports occasional pain in her palms, and swollen hands when she wakes up in the morning, a few minutes of stiffness, not daily. Also right shoulder pain, intermittent, ongoing for awhile, uses muscle rub, not sure if she sleeps on that side.  Did made appt for c-scope, is upcoming. Went to ophtho, has glaucoma, uses drops.

## 2023-08-18 NOTE — PHYSICAL EXAM
[No Acute Distress] : no acute distress [No Respiratory Distress] : no respiratory distress  [Speech Grossly Normal] : speech grossly normal [Normal Mood] : the mood was normal [Normal] : affect was normal and insight and judgment were intact [de-identified] : obesity [de-identified] : right knee crepitus, full ROM, no edema, warmth or erythema; right shoulder full ROM, pain in upper arm around muscle [de-identified] : favors right leg slightly

## 2023-08-31 ENCOUNTER — APPOINTMENT (OUTPATIENT)
Dept: GASTROENTEROLOGY | Facility: CLINIC | Age: 58
End: 2023-08-31

## 2023-10-19 ENCOUNTER — APPOINTMENT (OUTPATIENT)
Dept: NEUROLOGY | Facility: CLINIC | Age: 58
End: 2023-10-19
Payer: MEDICAID

## 2023-10-19 VITALS
HEIGHT: 60.5 IN | BODY MASS INDEX: 42.84 KG/M2 | DIASTOLIC BLOOD PRESSURE: 99 MMHG | SYSTOLIC BLOOD PRESSURE: 176 MMHG | HEART RATE: 76 BPM | WEIGHT: 224 LBS

## 2023-10-19 PROCEDURE — 99203 OFFICE O/P NEW LOW 30 MIN: CPT

## 2023-11-01 ENCOUNTER — APPOINTMENT (OUTPATIENT)
Dept: ORTHOPEDIC SURGERY | Facility: CLINIC | Age: 58
End: 2023-11-01

## 2023-11-09 ENCOUNTER — NON-APPOINTMENT (OUTPATIENT)
Age: 58
End: 2023-11-09

## 2023-11-13 DIAGNOSIS — M17.11 UNILATERAL PRIMARY OSTEOARTHRITIS, RIGHT KNEE: ICD-10-CM

## 2023-11-13 DIAGNOSIS — M25.561 PAIN IN RIGHT KNEE: ICD-10-CM

## 2023-11-30 ENCOUNTER — APPOINTMENT (OUTPATIENT)
Dept: INTERNAL MEDICINE | Facility: CLINIC | Age: 58
End: 2023-11-30
Payer: MEDICAID

## 2023-11-30 DIAGNOSIS — I10 ESSENTIAL (PRIMARY) HYPERTENSION: ICD-10-CM

## 2023-11-30 DIAGNOSIS — Z87.898 PERSONAL HISTORY OF OTHER SPECIFIED CONDITIONS: ICD-10-CM

## 2023-11-30 DIAGNOSIS — E11.69 TYPE 2 DIABETES MELLITUS WITH OTHER SPECIFIED COMPLICATION: ICD-10-CM

## 2023-11-30 DIAGNOSIS — E66.9 TYPE 2 DIABETES MELLITUS WITH OTHER SPECIFIED COMPLICATION: ICD-10-CM

## 2023-11-30 PROCEDURE — 90686 IIV4 VACC NO PRSV 0.5 ML IM: CPT

## 2023-11-30 PROCEDURE — 99214 OFFICE O/P EST MOD 30 MIN: CPT | Mod: 25

## 2023-11-30 PROCEDURE — G0008: CPT

## 2023-11-30 PROCEDURE — 83036 HEMOGLOBIN GLYCOSYLATED A1C: CPT | Mod: QW

## 2023-11-30 RX ORDER — GABAPENTIN 100 MG/1
100 CAPSULE ORAL
Qty: 60 | Refills: 3 | Status: DISCONTINUED | COMMUNITY
End: 2023-11-30

## 2023-11-30 RX ORDER — METFORMIN HYDROCHLORIDE 850 MG/1
850 TABLET, COATED ORAL
Qty: 60 | Refills: 3 | Status: ACTIVE | COMMUNITY
Start: 2022-08-17 | End: 1900-01-01

## 2023-11-30 RX ORDER — ATORVASTATIN CALCIUM 40 MG/1
40 TABLET, FILM COATED ORAL
Qty: 90 | Refills: 3 | Status: ACTIVE | COMMUNITY
Start: 2022-09-07 | End: 1900-01-01

## 2023-11-30 RX ORDER — AMLODIPINE BESYLATE 10 MG/1
10 TABLET ORAL DAILY
Qty: 90 | Refills: 3 | Status: ACTIVE | COMMUNITY
Start: 2022-08-17 | End: 1900-01-01

## 2023-12-01 ENCOUNTER — RESULT CHARGE (OUTPATIENT)
Age: 58
End: 2023-12-01

## 2023-12-06 ENCOUNTER — APPOINTMENT (OUTPATIENT)
Dept: NEUROLOGY | Facility: CLINIC | Age: 58
End: 2023-12-06
Payer: MEDICAID

## 2023-12-06 PROCEDURE — 95886 MUSC TEST DONE W/N TEST COMP: CPT | Mod: 59

## 2023-12-06 PROCEDURE — 95912 NRV CNDJ TEST 11-12 STUDIES: CPT

## 2024-02-20 ENCOUNTER — APPOINTMENT (OUTPATIENT)
Dept: INTERNAL MEDICINE | Facility: CLINIC | Age: 59
End: 2024-02-20

## 2024-02-20 RX ORDER — LOSARTAN POTASSIUM AND HYDROCHLOROTHIAZIDE 25; 100 MG/1; MG/1
100-25 TABLET ORAL
Qty: 90 | Refills: 3 | Status: ACTIVE | COMMUNITY
Start: 1900-01-01 | End: 1900-01-01

## 2024-02-22 ENCOUNTER — APPOINTMENT (OUTPATIENT)
Dept: NEUROLOGY | Facility: CLINIC | Age: 59
End: 2024-02-22
Payer: MEDICAID

## 2024-02-22 VITALS
WEIGHT: 221 LBS | HEIGHT: 64 IN | BODY MASS INDEX: 37.73 KG/M2 | HEART RATE: 67 BPM | SYSTOLIC BLOOD PRESSURE: 170 MMHG | DIASTOLIC BLOOD PRESSURE: 94 MMHG

## 2024-02-22 PROCEDURE — 99213 OFFICE O/P EST LOW 20 MIN: CPT

## 2024-02-22 NOTE — ASSESSMENT
[FreeTextEntry1] : This is a 58F with bilateral CTS, R>L. Having some improvement with OT but on exam she has significant muscle atrophy and sensory changes. We discussed at length the risk of her weakness and atrophy worsening and I recommend she get surgical evaluation. She was in agreement.  - C/w OT - Referral to Ortho for median nerve releast  F/u 3 months

## 2024-02-22 NOTE — PHYSICAL EXAM
[Person] : oriented to person [Place] : oriented to place [Time] : oriented to time [Fluency] : fluency intact [Cranial Nerves Oculomotor (III)] : extraocular motion intact [Cranial Nerves Optic (II)] : visual acuity intact bilaterally,  visual fields full to confrontation, pupils equal round and reactive to light [Cranial Nerves Facial (VII)] : face symmetrical [Cranial Nerves Trigeminal (V)] : facial sensation intact symmetrically [Cranial Nerves Vestibulocochlear (VIII)] : hearing was intact bilaterally [Cranial Nerves Glossopharyngeal (IX)] : tongue and palate midline [Cranial Nerves Accessory (XI - Cranial And Spinal)] : head turning and shoulder shrug symmetric [Cranial Nerves Hypoglossal (XII)] : there was no tongue deviation with protrusion [Motor Tone] : muscle tone was normal in all four extremities [Sensation Vibration Decrease] : vibration was intact [Abnormal Walk] : normal gait [Coordination - Dysmetria Impaired Finger-to-Nose Bilateral] : not present [FreeTextEntry6] : Significant atrophy of the bilateral APB. Weakness in right thumb abduction and opposition.

## 2024-02-22 NOTE — HISTORY OF PRESENT ILLNESS
[FreeTextEntry1] : At the last visit, the patient was asked to have EMG and was referred to Orthopedics and hand therapy. EMG showed b/l CTS and the patient was re-referred to hand thearpy and Ortho.  Has been going to hand therapy for about a month, finds some improvement with OT. Not experiencing tingling or swelling as much. Has not seen Orthopedist yet.  Also, reports right shoulder pain, worse in the AM with the cold, thinks it's likely arthritic. Denies radiating pain to the neck or down the arm.   Initial HPI This is a 58F who presents with bilateral hand numbness.  She reports that she has been having hand numbness for years. Was evaluated about 5 years ago and told that she has bilateral carpal tunnel syndrome but she never followed up or did anything about it. More recently, she feels that it's been getting worse. Tingling is more intense, she gets some burning, achiness at the wrists, and weakness in the right hand. Feels that it's worse in the morning, gets better as the day goes along. No neck pain. Feet are asymptomatic. 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

## 2024-03-04 ENCOUNTER — APPOINTMENT (OUTPATIENT)
Dept: ORTHOPEDIC SURGERY | Facility: CLINIC | Age: 59
End: 2024-03-04

## 2024-03-12 ENCOUNTER — APPOINTMENT (OUTPATIENT)
Dept: INTERNAL MEDICINE | Facility: CLINIC | Age: 59
End: 2024-03-12

## 2024-04-26 NOTE — PRE-OP CHECKLIST - PATIENT SENT TO
Call directed to me by . Pt called with c/o intermittent, one sided jaw pain x2 weeks. I advised urgent care as Dr. Maya is out of the office and both covering providers are full. Pt agreeable to this and will check back next week.    
holding area

## 2024-05-23 ENCOUNTER — APPOINTMENT (OUTPATIENT)
Dept: NEUROLOGY | Facility: CLINIC | Age: 59
End: 2024-05-23
Payer: MEDICAID

## 2024-05-23 VITALS
HEIGHT: 64 IN | SYSTOLIC BLOOD PRESSURE: 141 MMHG | BODY MASS INDEX: 37.73 KG/M2 | DIASTOLIC BLOOD PRESSURE: 84 MMHG | HEART RATE: 85 BPM | WEIGHT: 221 LBS

## 2024-05-23 DIAGNOSIS — G56.03 CARPAL TUNNEL SYNDROM,BILATERAL UPPER LIMBS: ICD-10-CM

## 2024-05-23 PROCEDURE — 99213 OFFICE O/P EST LOW 20 MIN: CPT

## 2024-05-23 NOTE — ASSESSMENT
[FreeTextEntry1] : This is a 58F with bilateral CTS, R>L. Has improved with OT but still with right thumb weakness, would recommend surgical evaluation.   - Can wear wrist braces for flare-ups - Ortho hand referral sent - Instructed the patient to see ortho for shoulder arthritis  F/u 6 months

## 2024-05-23 NOTE — PHYSICAL EXAM
[Person] : oriented to person [Place] : oriented to place [Time] : oriented to time [Fluency] : fluency intact [Cranial Nerves Optic (II)] : visual acuity intact bilaterally,  visual fields full to confrontation, pupils equal round and reactive to light [Cranial Nerves Oculomotor (III)] : extraocular motion intact [Cranial Nerves Trigeminal (V)] : facial sensation intact symmetrically [Cranial Nerves Facial (VII)] : face symmetrical [Cranial Nerves Vestibulocochlear (VIII)] : hearing was intact bilaterally [Cranial Nerves Glossopharyngeal (IX)] : tongue and palate midline [Cranial Nerves Accessory (XI - Cranial And Spinal)] : head turning and shoulder shrug symmetric [Cranial Nerves Hypoglossal (XII)] : there was no tongue deviation with protrusion [Motor Tone] : muscle tone was normal in all four extremities [Sensation Tactile Decrease] : light touch was intact [Sensation Pain / Temperature Decrease] : pain and temperature was intact [Sensation Vibration Decrease] : vibration was intact [Abnormal Walk] : normal gait [Coordination - Dysmetria Impaired Finger-to-Nose Bilateral] : not present [FreeTextEntry6] : Significant atrophy of the bilateral APB. Weakness in right thumb abduction and opposition.

## 2024-05-23 NOTE — HISTORY OF PRESENT ILLNESS
[FreeTextEntry1] : Comes in today for follow up. Finished OT, feels that it improved her symptoms but she still has occasional flare ups. Still having some difficulty with fine motor movements with the right hand. Outside of flare-ups, the hands feel well. Missed previous appt with hand surgeon.  Continues to have right shoulder pain and deep bone pain from the elbow to the shoulder. Went to urgent care and was referred to another doctor (she doesn't remember name or specialty). She had xray of the shoulder which showed arthritic changes. Is pending MRI of the shoulder.  2/22/24 At the last visit, the patient was asked to have EMG and was referred to Orthopedics and hand therapy. EMG showed b/l CTS and the patient was re-referred to hand thearpy and Ortho.  Has been going to hand therapy for about a month, finds some improvement with OT. Not experiencing tingling or swelling as much. Has not seen Orthopedist yet.  Also, reports right shoulder pain, worse in the AM with the cold, thinks it's likely arthritic. Denies radiating pain to the neck or down the arm.  Initial HPI This is a 58F who presents with bilateral hand numbness.  She reports that she has been having hand numbness for years. Was evaluated about 5 years ago and told that she has bilateral carpal tunnel syndrome but she never followed up or did anything about it. More recently, she feels that it's been getting worse. Tingling is more intense, she gets some burning, achiness at the wrists, and weakness in the right hand. Feels that it's worse in the morning, gets better as the day goes along. No neck pain. Feet are asymptomatic. 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

## 2024-05-30 ENCOUNTER — APPOINTMENT (OUTPATIENT)
Dept: ORTHOPEDIC SURGERY | Facility: CLINIC | Age: 59
End: 2024-05-30
Payer: MEDICAID

## 2024-05-30 DIAGNOSIS — M25.511 PAIN IN RIGHT SHOULDER: ICD-10-CM

## 2024-05-30 PROCEDURE — 99213 OFFICE O/P EST LOW 20 MIN: CPT | Mod: 25

## 2024-05-30 PROCEDURE — 73030 X-RAY EXAM OF SHOULDER: CPT | Mod: RT

## 2024-06-12 PROBLEM — M25.511 RIGHT SHOULDER PAIN: Status: ACTIVE | Noted: 2023-08-09

## 2024-06-12 NOTE — DISCUSSION/SUMMARY
[de-identified] : 57 y/o female with right shoulder pain.   I discussed the treatment of degenerative shoulder pain with the patient at length today. I described the spectrum of treatment from nonoperative modalities to total joint arthroplasty. Noninvasive and nonoperative treatment modalities include activity modification with avoidance of overhead activity/excessive glenohumeral rotation, PRN use of acetaminophen or anti-inflammatory medication if tolerated, and physical therapy. Additional treatment can include intermittent corticosteroid injection for acute pain relief. Possible use of visco-supplementation also.   Definitive treatment of total joint arthroplasty would be considered for failure of conservative management, progressive pain, and ultimate inability to perform ADL's.   Additional imaging could also be obtained to evaluate for soft tissue disease within the shoulder.  Recommendation: trial of PT. Rx given. NSAID's or acetaminophen as tolerated, with application of ice to the area 2-3x daily for 20 minutes after periods of activity.   Follow up as needed if symptoms persist.

## 2024-06-12 NOTE — ADDENDUM
[FreeTextEntry1] : This note was written by Mellissa Rowan on 05/30/2024 acting solely as a scribe for Dr. Jaxon Carter.  All medical record entries made by the Scribe were at my, Dr. Jaxon Carter, direction and personally dictated by me on 05/30/2024. I have personally reviewed the chart and agree that the record accurately reflects my personal performance of the history, physical exam, assessment and plan.

## 2024-06-12 NOTE — PHYSICAL EXAM
[de-identified] : Right shoulder exam  Inspection: No malalignment, No defects, No atrophy Skin: No masses, No lesions Neck: Negative Spurling's, full ROM, no pain with ROM AROM: FF to 130, abduction to 80, ER to 40, IR to mid lumbar  Painful arc ROM: none Tenderness: no bicipital tenderness, no tenderness to the greater tuberosity/RTC insertion, no anterior shoulder/lesser tuberosity tenderness Strength: 5/5 ER, 5/5 IR in adduction, 5/5 supraspinatus testing, negative Collier's test AC Joint: No ttp/pain with cross arm testing Biceps: Speed Negative, Yergusons Negative Impingement test: + Mancia, +Neer  Stability: Stable Vasc: 2+ radial pulse Neuro: AIN, PIN, Ulnar nerve intact to motor Sensation: Intact to light touch throughout [de-identified] : The following radiographs were ordered and read by me during this patients visit. I reviewed each radiograph in detail with the patient and discussed the findings as highlighted below.   3 views of the right shoulder were obtained today, 05/30/2024, that show no acute fracture or dislocation. There is mild glenohumeral and mild AC joint degenerative change seen. Type II acromion. There is no significant malalignment. No significant other obvious osseous abnormality, otherwise unremarkable.

## 2024-06-12 NOTE — HISTORY OF PRESENT ILLNESS
[de-identified] : 58 year old RHD female presents today with right shoulder pain x 1 year. No injury reported. The pain is intermittent brought on with cold weather and lifting. She was seen at urgent care 4 weeks ago and was given medication. Patient does not recall name. Patient works in laundry service.

## 2024-11-22 ENCOUNTER — APPOINTMENT (OUTPATIENT)
Dept: INTERNAL MEDICINE | Facility: CLINIC | Age: 59
End: 2024-11-22

## 2024-12-10 ENCOUNTER — APPOINTMENT (OUTPATIENT)
Dept: NEUROLOGY | Facility: CLINIC | Age: 59
End: 2024-12-10

## 2025-02-13 ENCOUNTER — NON-APPOINTMENT (OUTPATIENT)
Age: 60
End: 2025-02-13

## 2025-02-13 ENCOUNTER — APPOINTMENT (OUTPATIENT)
Dept: NEUROLOGY | Facility: CLINIC | Age: 60
End: 2025-02-13
Payer: COMMERCIAL

## 2025-02-13 VITALS
BODY MASS INDEX: 37.56 KG/M2 | WEIGHT: 220 LBS | HEART RATE: 82 BPM | HEIGHT: 64 IN | DIASTOLIC BLOOD PRESSURE: 70 MMHG | SYSTOLIC BLOOD PRESSURE: 187 MMHG

## 2025-02-13 DIAGNOSIS — M25.511 PAIN IN RIGHT SHOULDER: ICD-10-CM

## 2025-02-13 DIAGNOSIS — G56.03 CARPAL TUNNEL SYNDROM,BILATERAL UPPER LIMBS: ICD-10-CM

## 2025-02-13 PROCEDURE — 99213 OFFICE O/P EST LOW 20 MIN: CPT

## 2025-02-13 RX ORDER — MELOXICAM 15 MG/1
15 TABLET ORAL
Qty: 15 | Refills: 0 | Status: ACTIVE | COMMUNITY
Start: 2025-02-13 | End: 1900-01-01

## 2025-02-27 ENCOUNTER — APPOINTMENT (OUTPATIENT)
Dept: ORTHOPEDIC SURGERY | Facility: CLINIC | Age: 60
End: 2025-02-27
Payer: COMMERCIAL

## 2025-02-27 VITALS — BODY MASS INDEX: 40.48 KG/M2 | HEIGHT: 62 IN | WEIGHT: 220 LBS

## 2025-02-27 DIAGNOSIS — M25.511 PAIN IN RIGHT SHOULDER: ICD-10-CM

## 2025-02-27 PROCEDURE — 99213 OFFICE O/P EST LOW 20 MIN: CPT

## 2025-03-27 ENCOUNTER — APPOINTMENT (OUTPATIENT)
Dept: INTERNAL MEDICINE | Facility: CLINIC | Age: 60
End: 2025-03-27
Payer: COMMERCIAL

## 2025-03-27 ENCOUNTER — NON-APPOINTMENT (OUTPATIENT)
Age: 60
End: 2025-03-27

## 2025-03-27 VITALS
DIASTOLIC BLOOD PRESSURE: 100 MMHG | SYSTOLIC BLOOD PRESSURE: 170 MMHG | OXYGEN SATURATION: 96 % | HEART RATE: 72 BPM | BODY MASS INDEX: 40.3 KG/M2 | HEIGHT: 62 IN | WEIGHT: 219 LBS

## 2025-03-27 DIAGNOSIS — E11.9 TYPE 2 DIABETES MELLITUS W/OUT COMPLICATIONS: ICD-10-CM

## 2025-03-27 DIAGNOSIS — E66.01 MORBID (SEVERE) OBESITY DUE TO EXCESS CALORIES: ICD-10-CM

## 2025-03-27 DIAGNOSIS — Z00.00 ENCOUNTER FOR GENERAL ADULT MEDICAL EXAMINATION W/OUT ABNORMAL FINDINGS: ICD-10-CM

## 2025-03-27 DIAGNOSIS — R05.8 OTHER SPECIFIED COUGH: ICD-10-CM

## 2025-03-27 DIAGNOSIS — Z12.11 ENCOUNTER FOR SCREENING FOR MALIGNANT NEOPLASM OF COLON: ICD-10-CM

## 2025-03-27 DIAGNOSIS — I10 ESSENTIAL (PRIMARY) HYPERTENSION: ICD-10-CM

## 2025-03-27 PROCEDURE — 93000 ELECTROCARDIOGRAM COMPLETE: CPT

## 2025-03-27 PROCEDURE — 99396 PREV VISIT EST AGE 40-64: CPT | Mod: 25

## 2025-04-02 LAB
ALBUMIN SERPL ELPH-MCNC: 4.4 G/DL
ALP BLD-CCNC: 99 U/L
ALT SERPL-CCNC: 21 U/L
ANION GAP SERPL CALC-SCNC: 11 MMOL/L
APPEARANCE: CLEAR
AST SERPL-CCNC: 22 U/L
BACTERIA: NEGATIVE /HPF
BASOPHILS # BLD AUTO: 0.07 K/UL
BASOPHILS NFR BLD AUTO: 1.4 %
BILIRUB SERPL-MCNC: 1.1 MG/DL
BILIRUBIN URINE: NEGATIVE
BLOOD URINE: NEGATIVE
BUN SERPL-MCNC: 13 MG/DL
CALCIUM SERPL-MCNC: 9.9 MG/DL
CAST: 1 /LPF
CHLORIDE SERPL-SCNC: 103 MMOL/L
CHOLEST SERPL-MCNC: 121 MG/DL
CO2 SERPL-SCNC: 28 MMOL/L
COLOR: YELLOW
CREAT SERPL-MCNC: 0.86 MG/DL
CREAT SPEC-SCNC: 185 MG/DL
EGFRCR SERPLBLD CKD-EPI 2021: 77 ML/MIN/1.73M2
EOSINOPHIL # BLD AUTO: 0.2 K/UL
EOSINOPHIL NFR BLD AUTO: 4.1 %
EPITHELIAL CELLS: 2 /HPF
ESTIMATED AVERAGE GLUCOSE: 151 MG/DL
GLUCOSE QUALITATIVE U: NEGATIVE MG/DL
GLUCOSE SERPL-MCNC: 106 MG/DL
HBA1C MFR BLD HPLC: 6.9 %
HCT VFR BLD CALC: 43.1 %
HDLC SERPL-MCNC: 43 MG/DL
HGB BLD-MCNC: 12.9 G/DL
IMM GRANULOCYTES NFR BLD AUTO: 0.4 %
KETONES URINE: NEGATIVE MG/DL
LDLC SERPL-MCNC: 54 MG/DL
LEUKOCYTE ESTERASE URINE: ABNORMAL
LYMPHOCYTES # BLD AUTO: 2.4 K/UL
LYMPHOCYTES NFR BLD AUTO: 49.3 %
MAN DIFF?: NORMAL
MCHC RBC-ENTMCNC: 24.5 PG
MCHC RBC-ENTMCNC: 29.9 G/DL
MCV RBC AUTO: 81.9 FL
MICROALBUMIN 24H UR DL<=1MG/L-MCNC: 2.1 MG/DL
MICROALBUMIN/CREAT 24H UR-RTO: 11 MG/G
MICROSCOPIC-UA: NORMAL
MONOCYTES # BLD AUTO: 0.6 K/UL
MONOCYTES NFR BLD AUTO: 12.3 %
NEUTROPHILS # BLD AUTO: 1.58 K/UL
NEUTROPHILS NFR BLD AUTO: 32.5 %
NITRITE URINE: NEGATIVE
NONHDLC SERPL-MCNC: 78 MG/DL
PH URINE: 6
PLATELET # BLD AUTO: 284 K/UL
POTASSIUM SERPL-SCNC: 4.2 MMOL/L
PROT SERPL-MCNC: 7.3 G/DL
PROTEIN URINE: NEGATIVE MG/DL
RBC # BLD: 5.26 M/UL
RBC # FLD: 17.1 %
RED BLOOD CELLS URINE: 1 /HPF
SODIUM SERPL-SCNC: 142 MMOL/L
SPECIFIC GRAVITY URINE: 1.02
TRIGL SERPL-MCNC: 138 MG/DL
TSH SERPL-ACNC: 1.49 UIU/ML
UROBILINOGEN URINE: 0.2 MG/DL
WBC # FLD AUTO: 4.87 K/UL
WHITE BLOOD CELLS URINE: 1 /HPF

## 2025-05-15 ENCOUNTER — APPOINTMENT (OUTPATIENT)
Dept: INTERNAL MEDICINE | Facility: CLINIC | Age: 60
End: 2025-05-15
Payer: COMMERCIAL

## 2025-05-15 VITALS
BODY MASS INDEX: 40.67 KG/M2 | WEIGHT: 221 LBS | HEIGHT: 62 IN | OXYGEN SATURATION: 96 % | HEART RATE: 72 BPM | DIASTOLIC BLOOD PRESSURE: 100 MMHG | SYSTOLIC BLOOD PRESSURE: 135 MMHG

## 2025-05-15 VITALS — DIASTOLIC BLOOD PRESSURE: 100 MMHG | SYSTOLIC BLOOD PRESSURE: 170 MMHG

## 2025-05-15 DIAGNOSIS — E66.01 MORBID (SEVERE) OBESITY DUE TO EXCESS CALORIES: ICD-10-CM

## 2025-05-15 DIAGNOSIS — E11.9 TYPE 2 DIABETES MELLITUS W/OUT COMPLICATIONS: ICD-10-CM

## 2025-05-15 DIAGNOSIS — I10 ESSENTIAL (PRIMARY) HYPERTENSION: ICD-10-CM

## 2025-05-15 PROCEDURE — 99214 OFFICE O/P EST MOD 30 MIN: CPT | Mod: 25

## 2025-05-15 RX ORDER — OLMESARTAN MEDOXOMIL AND HYDROCHLOROTHIAZIDE 40; 25 MG/1; MG/1
40-25 TABLET ORAL
Qty: 100 | Refills: 1 | Status: ACTIVE | COMMUNITY
Start: 2025-05-15 | End: 1900-01-01

## 2025-05-15 RX ORDER — METFORMIN ER 500 MG 500 MG/1
500 TABLET ORAL
Qty: 360 | Refills: 1 | Status: ACTIVE | COMMUNITY

## 2025-05-19 LAB
ALBUMIN SERPL ELPH-MCNC: 4.4 G/DL
ALP BLD-CCNC: 97 U/L
ALT SERPL-CCNC: 31 U/L
ANION GAP SERPL CALC-SCNC: 13 MMOL/L
AST SERPL-CCNC: 31 U/L
BILIRUB SERPL-MCNC: 1.3 MG/DL
BUN SERPL-MCNC: 17 MG/DL
CALCIUM SERPL-MCNC: 9.7 MG/DL
CHLORIDE SERPL-SCNC: 104 MMOL/L
CHOLEST SERPL-MCNC: 91 MG/DL
CO2 SERPL-SCNC: 24 MMOL/L
CREAT SERPL-MCNC: 0.77 MG/DL
EGFRCR SERPLBLD CKD-EPI 2021: 88 ML/MIN/1.73M2
ESTIMATED AVERAGE GLUCOSE: 157 MG/DL
GLUCOSE SERPL-MCNC: 118 MG/DL
HBA1C MFR BLD HPLC: 7.1 %
HDLC SERPL-MCNC: 44 MG/DL
LDLC SERPL-MCNC: 31 MG/DL
NONHDLC SERPL-MCNC: 47 MG/DL
POTASSIUM SERPL-SCNC: 4.1 MMOL/L
PROT SERPL-MCNC: 7.2 G/DL
SODIUM SERPL-SCNC: 141 MMOL/L
TRIGL SERPL-MCNC: 80 MG/DL

## 2025-05-20 ENCOUNTER — NON-APPOINTMENT (OUTPATIENT)
Age: 60
End: 2025-05-20

## 2025-06-20 ENCOUNTER — APPOINTMENT (OUTPATIENT)
Dept: INTERNAL MEDICINE | Facility: CLINIC | Age: 60
End: 2025-06-20

## 2025-07-18 ENCOUNTER — APPOINTMENT (OUTPATIENT)
Dept: INTERNAL MEDICINE | Facility: CLINIC | Age: 60
End: 2025-07-18
Payer: COMMERCIAL

## 2025-07-18 VITALS
HEIGHT: 62 IN | DIASTOLIC BLOOD PRESSURE: 110 MMHG | WEIGHT: 219 LBS | SYSTOLIC BLOOD PRESSURE: 153 MMHG | OXYGEN SATURATION: 98 % | BODY MASS INDEX: 40.3 KG/M2 | HEART RATE: 68 BPM

## 2025-07-18 VITALS — DIASTOLIC BLOOD PRESSURE: 102 MMHG | SYSTOLIC BLOOD PRESSURE: 154 MMHG

## 2025-07-18 VITALS — SYSTOLIC BLOOD PRESSURE: 154 MMHG | DIASTOLIC BLOOD PRESSURE: 102 MMHG

## 2025-07-18 LAB
ALBUMIN SERPL ELPH-MCNC: 4.4 G/DL
ALP BLD-CCNC: 89 U/L
ALT SERPL-CCNC: 27 U/L
ANION GAP SERPL CALC-SCNC: 12 MMOL/L
AST SERPL-CCNC: 28 U/L
BILIRUB SERPL-MCNC: 1.2 MG/DL
BUN SERPL-MCNC: 21 MG/DL
CALCIUM SERPL-MCNC: 10.2 MG/DL
CHLORIDE SERPL-SCNC: 104 MMOL/L
CHOLEST SERPL-MCNC: 83 MG/DL
CO2 SERPL-SCNC: 24 MMOL/L
CREAT SERPL-MCNC: 0.8 MG/DL
EGFRCR SERPLBLD CKD-EPI 2021: 84 ML/MIN/1.73M2
ESTIMATED AVERAGE GLUCOSE: 151 MG/DL
GLUCOSE SERPL-MCNC: 103 MG/DL
HBA1C MFR BLD HPLC: 6.9 %
HDLC SERPL-MCNC: 41 MG/DL
LDLC SERPL-MCNC: 20 MG/DL
NONHDLC SERPL-MCNC: 41 MG/DL
POTASSIUM SERPL-SCNC: 4.1 MMOL/L
PROT SERPL-MCNC: 6.9 G/DL
SODIUM SERPL-SCNC: 139 MMOL/L
TRIGL SERPL-MCNC: 119 MG/DL

## 2025-07-18 PROCEDURE — 99214 OFFICE O/P EST MOD 30 MIN: CPT | Mod: 25

## 2025-07-18 RX ORDER — DILTIAZEM HYDROCHLORIDE 120 MG/1
120 CAPSULE, EXTENDED RELEASE ORAL
Qty: 90 | Refills: 0 | Status: ACTIVE | COMMUNITY
Start: 2025-07-18 | End: 1900-01-01

## 2025-08-21 ENCOUNTER — APPOINTMENT (OUTPATIENT)
Dept: INTERNAL MEDICINE | Facility: CLINIC | Age: 60
End: 2025-08-21
Payer: COMMERCIAL

## 2025-08-21 VITALS
WEIGHT: 218 LBS | HEIGHT: 62 IN | BODY MASS INDEX: 40.12 KG/M2 | DIASTOLIC BLOOD PRESSURE: 100 MMHG | HEART RATE: 66 BPM | SYSTOLIC BLOOD PRESSURE: 142 MMHG | OXYGEN SATURATION: 96 %

## 2025-08-21 VITALS — SYSTOLIC BLOOD PRESSURE: 150 MMHG | DIASTOLIC BLOOD PRESSURE: 102 MMHG

## 2025-08-21 DIAGNOSIS — E66.813 OBESITY, CLASS 3: ICD-10-CM

## 2025-08-21 DIAGNOSIS — I10 ESSENTIAL (PRIMARY) HYPERTENSION: ICD-10-CM

## 2025-08-21 DIAGNOSIS — E11.9 TYPE 2 DIABETES MELLITUS W/OUT COMPLICATIONS: ICD-10-CM

## 2025-08-21 PROCEDURE — 99214 OFFICE O/P EST MOD 30 MIN: CPT | Mod: 25
